# Patient Record
Sex: FEMALE
[De-identification: names, ages, dates, MRNs, and addresses within clinical notes are randomized per-mention and may not be internally consistent; named-entity substitution may affect disease eponyms.]

---

## 2018-06-17 ENCOUNTER — TRANSCRIPTION ENCOUNTER (OUTPATIENT)
Age: 23
End: 2018-06-17

## 2019-04-03 ENCOUNTER — TRANSCRIPTION ENCOUNTER (OUTPATIENT)
Age: 24
End: 2019-04-03

## 2019-04-25 ENCOUNTER — TRANSCRIPTION ENCOUNTER (OUTPATIENT)
Age: 24
End: 2019-04-25

## 2019-09-26 PROBLEM — Z00.00 ENCOUNTER FOR PREVENTIVE HEALTH EXAMINATION: Status: ACTIVE | Noted: 2019-09-26

## 2019-09-27 ENCOUNTER — APPOINTMENT (OUTPATIENT)
Dept: ORTHOPEDIC SURGERY | Facility: CLINIC | Age: 24
End: 2019-09-27
Payer: MEDICAID

## 2019-09-27 VITALS
HEIGHT: 61 IN | SYSTOLIC BLOOD PRESSURE: 100 MMHG | DIASTOLIC BLOOD PRESSURE: 65 MMHG | BODY MASS INDEX: 24.55 KG/M2 | WEIGHT: 130 LBS | HEART RATE: 78 BPM

## 2019-09-27 DIAGNOSIS — G25.89 OTHER SPECIFIED EXTRAPYRAMIDAL AND MOVEMENT DISORDERS: ICD-10-CM

## 2019-09-27 PROCEDURE — 99204 OFFICE O/P NEW MOD 45 MIN: CPT

## 2019-09-27 PROCEDURE — 73030 X-RAY EXAM OF SHOULDER: CPT | Mod: LT

## 2019-09-27 RX ORDER — MELOXICAM 7.5 MG/1
7.5 TABLET ORAL DAILY
Qty: 21 | Refills: 1 | Status: ACTIVE | COMMUNITY
Start: 2019-09-27 | End: 1900-01-01

## 2019-12-06 ENCOUNTER — APPOINTMENT (OUTPATIENT)
Dept: ORTHOPEDIC SURGERY | Facility: CLINIC | Age: 24
End: 2019-12-06

## 2021-04-21 ENCOUNTER — TRANSCRIPTION ENCOUNTER (OUTPATIENT)
Age: 26
End: 2021-04-21

## 2021-06-11 ENCOUNTER — TRANSCRIPTION ENCOUNTER (OUTPATIENT)
Age: 26
End: 2021-06-11

## 2021-06-22 ENCOUNTER — APPOINTMENT (OUTPATIENT)
Dept: GASTROENTEROLOGY | Facility: CLINIC | Age: 26
End: 2021-06-22

## 2021-06-27 ENCOUNTER — TRANSCRIPTION ENCOUNTER (OUTPATIENT)
Age: 26
End: 2021-06-27

## 2021-10-18 ENCOUNTER — APPOINTMENT (OUTPATIENT)
Dept: ORTHOPEDIC SURGERY | Facility: CLINIC | Age: 26
End: 2021-10-18
Payer: OTHER MISCELLANEOUS

## 2021-10-18 VITALS
BODY MASS INDEX: 23.41 KG/M2 | SYSTOLIC BLOOD PRESSURE: 119 MMHG | HEIGHT: 61 IN | HEART RATE: 94 BPM | DIASTOLIC BLOOD PRESSURE: 79 MMHG | WEIGHT: 124 LBS

## 2021-10-18 PROCEDURE — 99214 OFFICE O/P EST MOD 30 MIN: CPT

## 2021-10-18 PROCEDURE — 73564 X-RAY EXAM KNEE 4 OR MORE: CPT | Mod: RT

## 2021-10-18 RX ORDER — MELOXICAM 7.5 MG/1
7.5 TABLET ORAL TWICE DAILY
Qty: 42 | Refills: 0 | Status: ACTIVE | COMMUNITY
Start: 2021-10-18 | End: 1900-01-01

## 2021-10-20 NOTE — ADDENDUM
[FreeTextEntry1] : Documented by Luis Enrique Joyce acting as a scribe for Dr. Gandara on 10/18/2021. \par \par All medical record entries made by the Scribe were at my, Dr. Gandara's, direction and\par personally dictated by me on 10/18/2021. I have reviewed the chart and agree that the record\par accurately reflects my personal performance of the history, physical exam, procedure and imaging.

## 2021-10-20 NOTE — PHYSICAL EXAM
[de-identified] : Physical Exam:\par General: Well appearing, no acute distress, A&O\par Neurologic: A&Ox3, No focal deficits\par Head: NCAT without abrasions, lacerations, or ecchymosis to head, face, or scalp\par Eyes: No scleral icterus, no gross abnormalities\par Respiratory: Equal chest wall expansion bilaterally, no accessory muscle use\par Lymphatic: No lymphadenopathy palpated\par Skin: Warm and dry\par Psychiatric: Normal mood and affect\par \par Right Knee: Range of Motion in Degrees	\par 	  Claimant:	Normal:	\par Flexion Active	 135 	 135-degrees	\par Flexion Passive	 135	 135-degrees	\par Extension Active	 0-5	 0-5-degrees	\par Extension Passive	 0-5	 0-5-degrees	\par \par TTP gerids tuberculi. Moderate effusion. Patellar crepitation noted on flexion. IT band TTP. Lateral JL TTP. Tenderness over the tibial tubercle. No tenderness over the tendon at this time. No weakness to flexion/extension. Tenderness over the pes bursa. No evidence of instability in the AP plane or varus or valgus stress. Negative Lachman. Negative pivot shift. Negative anterior drawer test. positive posterior drawer test. positive Sunni. positive Apley grind. No medial joint line tenderness. No tenderness over the medial and lateral facet of the patella. patellofemoral crepitations. No lateral tilting patella. No patella apprehension. No crepitation in the medial and lateral femoral condyle. No proximal or distal swelling, edema or tenderness. No gross motor or sensory deficits. No intra-articular swelling. Extra-articular swelling over the proximal medial aspect of the tibia. 2+ DP and PT pulses. No varus or valgus malalignment. Skin is intact. No rashes, scars or lesions. \par  \par Left Knee: Range of Motion in Degrees	\par 	  Claimant:	Normal:	\par Flexion Active	 135 	 135-degrees	\par Flexion Passive	 135	 135-degrees	\par Extension Active	 0-5	 0-5-degrees	\par Extension Passive	 0-5	 0-5-degrees	\par \par No weakness to flexion/extension. No evidence of instability in the AP plane or varus or valgus stress. Negative Lachman. Negative pivot shift. Negative anterior drawer test. Negative posterior drawer test. Negative Sunni. Negative Apley grind. No medial or lateral joint line tenderness. No tenderness over the medial and lateral facet of the patella. No patellofemoral crepitations. No lateral tilting patella. No patellar apprehension. No crepitation in the medial and lateral femoral condyle. No proximal or distal swelling, edema or tenderness. No gross motor or sensory deficits. No intra-articular swelling. 2+ DP and PT pulses. No varus or valgus malalignment. Skin is intact. No rashes, scars or lesions.  [de-identified] : 4 views of R knee were performed today and available for me to review. Results were discussed with the patient. They demonstrate patella sitting centrally, Serena Liudmila 1, lateral titling of patella,  no f/x, dislocation or other deformity.\par

## 2021-10-20 NOTE — DISCUSSION/SUMMARY
[de-identified] : SAMUEL BERMAN is a 26 year female being seen for initial visit R knee pain. 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 She reports her pain began on 09/02/2021 while running at the [employer], there was no acute injury or fall. After her pain started she states she was limping. She presented to an  facility where she was d/x with a knee sprain. After this injury she took a break from training which improved her pain, but she was still experiencing pain with stairs. When she returned to training, she states her pain immediately presented again. Currently, she reports most pain with running and stairs. She endorses her knee giving out. She reports most pain over lateral knee. She endorses painful clicking. She states she is unable to run to her full potential due to pain. Patient denies numbness and tingling to the extremities. She reports taking ibuprofen with no relief. She reports no previous R knee injuries. She reports wearing an OTC knee sleeve and brace with no relief. She reports taking 600 mg ibuprofen twice per day. \par \par We had a thorough discussion regarding the nature of her pain, the pathophysiology, as well as all treatment options. Based on her normal radiographs, and clinical exam, patient is developing IT band friction syndrome along with patellar maltracking, and questionable meniscus tearing. I discussed operative and non-operative treatment modalities. Considering patient current presentation of pain, clinical exam, and radiographs, I discussed the need for MRI. Patient states [facility] already order MRI which is schedule for tomorrow. Patient has started physical therapy too at [facility], and seeing mild improvement. A prescription of Meloxicam was given to be taken as directed with food to prevent GI upset, if occurs pt to D/C and call us at that time.  Conservative measures of treatment include rest until asymptomatic, activity avoidance, NSAID's PRN, application to ice to the area 2-3x daily for 20 minutes, with gradual return to activities. Patient will follow up for MRI review upon receiving its results. All questions were answered and the patient verbalized understanding. The patient is in agreement with this treatment plan.

## 2021-10-20 NOTE — HISTORY OF PRESENT ILLNESS
[Worsening] : worsening [___ mths] : [unfilled] month(s) ago [de-identified] : SAMUEL BERMAN is a 26 year female being seen for initial visit R knee pain. 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 She reports her pain began on 09/02/2021 while running at the [employer], there was no acute injury or fall. After her pain started she states she was limping. She presented to an  facility where she was d/x with a knee sprain. After this injury she took a break from training which improved her pain, but she was still experiencing pain with stairs. When she returned to training, she states her pain immediately presented again. Currently, she reports most pain with running and stairs. She endorses her knee giving out. She reports most pain over lateral knee. She endorses painful clicking. She states she is unable to run to her full potential due to pain. Patient denies numbness and tingling to the extremities. She reports taking ibuprofen with no relief. She reports no previous R knee injuries. She reports wearing an OTC knee sleeve and brace with no relief. She reports taking 600 mg ibuprofen twice per day.

## 2021-10-21 ENCOUNTER — NON-APPOINTMENT (OUTPATIENT)
Age: 26
End: 2021-10-21

## 2021-10-22 ENCOUNTER — APPOINTMENT (OUTPATIENT)
Dept: ORTHOPEDIC SURGERY | Facility: CLINIC | Age: 26
End: 2021-10-22

## 2021-10-26 ENCOUNTER — NON-APPOINTMENT (OUTPATIENT)
Age: 26
End: 2021-10-26

## 2021-10-26 ENCOUNTER — APPOINTMENT (OUTPATIENT)
Dept: ORTHOPEDIC SURGERY | Facility: CLINIC | Age: 26
End: 2021-10-26
Payer: OTHER MISCELLANEOUS

## 2021-10-26 VITALS
BODY MASS INDEX: 23.41 KG/M2 | HEIGHT: 61 IN | HEART RATE: 67 BPM | WEIGHT: 124 LBS | DIASTOLIC BLOOD PRESSURE: 69 MMHG | SYSTOLIC BLOOD PRESSURE: 102 MMHG

## 2021-10-26 PROCEDURE — 99214 OFFICE O/P EST MOD 30 MIN: CPT | Mod: 25

## 2021-10-26 PROCEDURE — 99072 ADDL SUPL MATRL&STAF TM PHE: CPT

## 2021-10-26 PROCEDURE — 20610 DRAIN/INJ JOINT/BURSA W/O US: CPT | Mod: RT

## 2021-10-26 NOTE — PROCEDURE
[de-identified] : Injection: Right knee IT band tendon\par Indication: ITB tendinitis \par \par A discussion was had with the patient regarding this procedure and all questions were answered. All risks, benefits and alternatives were discussed. These included but were not limited to bleeding, infection, and allergic reaction. Alcohol was used to clean the skin, and betadine was used to sterilize and prep the area IT band tendon superior to gerdy's tubercle. Ethyl chloride spray was then used as a topical anesthetic. A 22-gauge needle was used to inject 3cc of 1% Xylocaine, 2cc of 0.25% Bupivacaine and 1cc of 40mg/mL Triamcinolone Acetonide into the right ITB tendon insertion at knee joint. A sterile bandage was then applied. The patient tolerated the procedure well and there were no complications \par \par \par \par lateral femoral epicondyke

## 2021-10-26 NOTE — DISCUSSION/SUMMARY
[de-identified] : SAMUEL BERMAN is a 26 year female being seen for f/u visit R knee pain. She presents today for ITB tendon cortisone injection, as  required a prior approval for this. She states her pain has been about same as before. She is massaging her IT band that provided moderate relief. \par \par We had a thorough discussion regarding the nature of her pain, the pathophysiology, as well as all treatment options. I discussed operative and non-operative treatment modalities. Pt due to her acute pain elected for a corticosteroid injection at today's visit and tolerated the procedure well. She should take it easy for the next 2-3 days while icing the joint. Conservative measures of treatment include rest until asymptomatic, activity avoidance, NSAID's PRN, application to ice to the area 2-3x daily for 20 minutes, with gradual return to activities. Patient will follow up in 6-8 wks or on prn basis for repeat clinical assessment. All questions were answered and the patient verbalized understanding. The patient is in agreement with this treatment plan.

## 2021-10-26 NOTE — PHYSICAL EXAM
[de-identified] : Physical Exam:\par General: Well appearing, no acute distress, A&O\par Neurologic: A&Ox3, No focal deficits\par Head: NCAT without abrasions, lacerations, or ecchymosis to head, face, or scalp\par Eyes: No scleral icterus, no gross abnormalities\par Respiratory: Equal chest wall expansion bilaterally, no accessory muscle use\par Lymphatic: No lymphadenopathy palpated\par Skin: Warm and dry\par Psychiatric: Normal mood and affect\par \par Right Knee: Range of Motion in Degrees	\par 	  Claimant:	Normal:	\par Flexion Active	 135 	 135-degrees	\par Flexion Passive	 135	 135-degrees	\par Extension Active	 0-5	 0-5-degrees	\par Extension Passive	 0-5	 0-5-degrees	\par \par TTP gerids tuberculi. Moderate effusion. Patellar crepitation noted on flexion. IT band TTP. Lateral JL TTP. Tenderness over the tibial tubercle. No tenderness over the tendon at this time. No weakness to flexion/extension. Tenderness over the pes bursa. No evidence of instability in the AP plane or varus or valgus stress. Negative Lachman. Negative pivot shift. Negative anterior drawer test. positive posterior drawer test. positive Sunni. positive Apley grind. No medial joint line tenderness. No tenderness over the medial and lateral facet of the patella. patellofemoral crepitations. No lateral tilting patella. No patella apprehension. No crepitation in the medial and lateral femoral condyle. No proximal or distal swelling, edema or tenderness. No gross motor or sensory deficits. No intra-articular swelling. Extra-articular swelling over the proximal medial aspect of the tibia. 2+ DP and PT pulses. No varus or valgus malalignment. Skin is intact. No rashes, scars or lesions. \par  \par Left Knee: Range of Motion in Degrees	\par 	  Claimant:	Normal:	\par Flexion Active	 135 	 135-degrees	\par Flexion Passive	 135	 135-degrees	\par Extension Active	 0-5	 0-5-degrees	\par Extension Passive	 0-5	 0-5-degrees	\par \par No weakness to flexion/extension. No evidence of instability in the AP plane or varus or valgus stress. Negative Lachman. Negative pivot shift. Negative anterior drawer test. Negative posterior drawer test. Negative Sunni. Negative Apley grind. No medial or lateral joint line tenderness. No tenderness over the medial and lateral facet of the patella. No patellofemoral crepitations. No lateral tilting patella. No patellar apprehension. No crepitation in the medial and lateral femoral condyle. No proximal or distal swelling, edema or tenderness. No gross motor or sensory deficits. No intra-articular swelling. 2+ DP and PT pulses. No varus or valgus malalignment. Skin is intact. No rashes, scars or lesions.

## 2021-10-26 NOTE — ADDENDUM
[FreeTextEntry1] : Documented by Luis Enrique Joyce acting as a scribe for Dr. Gandara on 10/26/2021. \par \par All medical record entries made by the Scribe were at my, Dr. Gandara's, direction and\par personally dictated by me on 10/26/2021. I have reviewed the chart and agree that the record\par accurately reflects my personal performance of the history, physical exam, procedure and imaging.

## 2021-10-26 NOTE — HISTORY OF PRESENT ILLNESS
[Worsening] : worsening [___ mths] : [unfilled] month(s) ago [de-identified] : SAMUEL BERMAN is a 26 year female being seen for f/u visit R knee pain. She presents today for ITB tendon cortisone injection, as  required a prior approval for this. She states her pain has been about same as before. She is massaging her IT band that provided moderate relief.

## 2021-10-27 ENCOUNTER — FORM ENCOUNTER (OUTPATIENT)
Age: 26
End: 2021-10-27

## 2021-11-08 ENCOUNTER — APPOINTMENT (OUTPATIENT)
Dept: ORTHOPEDIC SURGERY | Facility: CLINIC | Age: 26
End: 2021-11-08
Payer: OTHER MISCELLANEOUS

## 2021-11-08 VITALS
HEIGHT: 61 IN | SYSTOLIC BLOOD PRESSURE: 115 MMHG | BODY MASS INDEX: 23.41 KG/M2 | DIASTOLIC BLOOD PRESSURE: 73 MMHG | HEART RATE: 75 BPM | WEIGHT: 124 LBS

## 2021-11-08 DIAGNOSIS — M75.22 BICIPITAL TENDINITIS, LEFT SHOULDER: ICD-10-CM

## 2021-11-08 PROCEDURE — 99072 ADDL SUPL MATRL&STAF TM PHE: CPT

## 2021-11-08 PROCEDURE — 99214 OFFICE O/P EST MOD 30 MIN: CPT

## 2021-11-08 NOTE — ADDENDUM
[FreeTextEntry1] : Documented by Luis Enrique Joyce acting as a scribe for Dr. Gandara on 11/08/2021. \par \par All medical record entries made by the Scribe were at my, Dr. Gandara's, direction and\par personally dictated by me on 11/08/2021. I have reviewed the chart and agree that the record\par accurately reflects my personal performance of the history, physical exam, procedure and imaging.

## 2021-11-08 NOTE — PHYSICAL EXAM
[de-identified] : Physical Exam:\par General: Well appearing, no acute distress, A&O\par Neurologic: A&Ox3, No focal deficits\par Head: NCAT without abrasions, lacerations, or ecchymosis to head, face, or scalp\par Eyes: No scleral icterus, no gross abnormalities\par Respiratory: Equal chest wall expansion bilaterally, no accessory muscle use\par Lymphatic: No lymphadenopathy palpated\par Skin: Warm and dry\par Psychiatric: Normal mood and affect\par \par Right Knee: Range of Motion in Degrees	\par 	  Claimant:	Normal:	\par Flexion Active	 135 	 135-degrees	\par Flexion Passive	 135	 135-degrees	\par Extension Active	 0-5	 0-5-degrees	\par Extension Passive	 0-5	 0-5-degrees	\par \par TTP gerids tuberculi. Moderate effusion. Patellar crepitation noted on flexion. IT band TTP. Lateral JL TTP. Tenderness over the tibial tubercle. No tenderness over the tendon at this time. No weakness to flexion/extension. Tenderness over the pes bursa. No evidence of instability in the AP plane or varus or valgus stress. Negative Lachman. Negative pivot shift. Negative anterior drawer test. positive posterior drawer test. positive Sunni. positive Apley grind. No medial joint line tenderness. No tenderness over the medial and lateral facet of the patella. patellofemoral crepitations. No lateral tilting patella. No patella apprehension. No crepitation in the medial and lateral femoral condyle. No proximal or distal swelling, edema or tenderness. No gross motor or sensory deficits. No intra-articular swelling. Extra-articular swelling over the proximal medial aspect of the tibia. 2+ DP and PT pulses. No varus or valgus malalignment. Skin is intact. No rashes, scars or lesions. \par  \par Left Knee: Range of Motion in Degrees	\par 	  Claimant:	Normal:	\par Flexion Active	 135 	 135-degrees	\par Flexion Passive	 135	 135-degrees	\par Extension Active	 0-5	 0-5-degrees	\par Extension Passive	 0-5	 0-5-degrees	\par \par No weakness to flexion/extension. No evidence of instability in the AP plane or varus or valgus stress. Negative Lachman. Negative pivot shift. Negative anterior drawer test. Negative posterior drawer test. Negative Sunni. Negative Apley grind. No medial or lateral joint line tenderness. No tenderness over the medial and lateral facet of the patella. No patellofemoral crepitations. No lateral tilting patella. No patellar apprehension. No crepitation in the medial and lateral femoral condyle. No proximal or distal swelling, edema or tenderness. No gross motor or sensory deficits. No intra-articular swelling. 2+ DP and PT pulses. No varus or valgus malalignment. Skin is intact. No rashes, scars or lesions.

## 2021-11-08 NOTE — DISCUSSION/SUMMARY
[de-identified] : SAMUEL BERMAN is a 26 year female being seen for f/u visit R knee pain. She had cortisone injection at last visit that provided her relief. She reports noticing stiffness in IT band, along with pain in lateral knee joint. She is performing physical therapy and noticing improvement. 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 \par \par We had a thorough discussion regarding the nature of her pain, the pathophysiology, as well as all treatment options. She has significant tenderness over IT band, and gerdis tubercle. I discussed as of now continuing physical therapy, but with another location as they haven't been massaging. A new PT prescription was provided today. Conservative measures of treatment include rest until asymptomatic, activity avoidance, NSAID's PRN, application to ice to the area 2-3x daily for 20 minutes, with gradual return to activities. Patient will follow up in 2-3 wks for repeat clinical assessment. All questions were answered and the patient verbalized understanding. The patient is in agreement with this treatment plan.

## 2021-11-08 NOTE — HISTORY OF PRESENT ILLNESS
[Worsening] : worsening [___ mths] : [unfilled] month(s) ago [de-identified] : SAMUEL BERMAN is a 26 year female being seen for f/u visit R knee pain. She had cortisone injection at last visit that provided her relief. She reports noticing stiffness in IT band, along with pain in lateral knee joint. She is performing physical therapy and noticing improvement. 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

## 2021-11-15 ENCOUNTER — RX RENEWAL (OUTPATIENT)
Age: 26
End: 2021-11-15

## 2021-11-15 RX ORDER — MELOXICAM 7.5 MG/1
7.5 TABLET ORAL
Qty: 21 | Refills: 0 | Status: ACTIVE | COMMUNITY
Start: 2021-10-19 | End: 1900-01-01

## 2021-11-15 RX ORDER — MELOXICAM 7.5 MG/1
7.5 TABLET ORAL
Qty: 42 | Refills: 0 | Status: ACTIVE | COMMUNITY
Start: 2021-11-15 | End: 1900-01-01

## 2021-11-29 ENCOUNTER — APPOINTMENT (OUTPATIENT)
Dept: ORTHOPEDIC SURGERY | Facility: CLINIC | Age: 26
End: 2021-11-29
Payer: OTHER MISCELLANEOUS

## 2021-11-29 VITALS
WEIGHT: 124 LBS | BODY MASS INDEX: 23.41 KG/M2 | HEART RATE: 81 BPM | SYSTOLIC BLOOD PRESSURE: 117 MMHG | DIASTOLIC BLOOD PRESSURE: 78 MMHG | HEIGHT: 61 IN

## 2021-11-29 PROCEDURE — 99072 ADDL SUPL MATRL&STAF TM PHE: CPT

## 2021-11-29 PROCEDURE — 99214 OFFICE O/P EST MOD 30 MIN: CPT

## 2021-11-29 NOTE — HISTORY OF PRESENT ILLNESS
[___ mths] : [unfilled] month(s) ago [Improving] : improving [de-identified] : SAMUEL BERMAN is a 26 year female being seen for f/u visit R knee pain. She had cortisone injection on 10/26/2021 that provided her relief. Currently,  she reports some improvement with physical therapy, but not complete relief. She reports [facility] therapy did loosen her It band. \par

## 2021-11-29 NOTE — PHYSICAL EXAM
[de-identified] : Physical Exam:\par General: Well appearing, no acute distress, A&O\par Neurologic: A&Ox3, No focal deficits\par Head: NCAT without abrasions, lacerations, or ecchymosis to head, face, or scalp\par Eyes: No scleral icterus, no gross abnormalities\par Respiratory: Equal chest wall expansion bilaterally, no accessory muscle use\par Lymphatic: No lymphadenopathy palpated\par Skin: Warm and dry\par Psychiatric: Normal mood and affect\par \par Right Knee: Range of Motion in Degrees	\par 	  Claimant:	Normal:	\par Flexion Active	 135 	 135-degrees	\par Flexion Passive	 135	 135-degrees	\par Extension Active	 0-5	 0-5-degrees	\par Extension Passive	 0-5	 0-5-degrees	\par \par No effusion. Patellar crepitation noted on flexion. IT band TTP but significantly less than previous exam. Lateral JL TTP.  No tenderness over the tendon at this time. No weakness to flexion/extension. Tenderness over the pes bursa. No evidence of instability in the AP plane or varus or valgus stress. Negative Lachman. Negative pivot shift. Negative anterior drawer test. positive posterior drawer test. positive Sunni. positive Apley grind. No medial joint line tenderness. No tenderness over the medial and lateral facet of the patella. patellofemoral crepitations. No lateral tilting patella. No patella apprehension. No crepitation in the medial and lateral femoral condyle. No proximal or distal swelling, edema or tenderness. No gross motor or sensory deficits. No intra-articular swelling. Extra-articular swelling over the proximal medial aspect of the tibia. 2+ DP and PT pulses. No varus or valgus malalignment. Skin is intact. No rashes, scars or lesions. \par  \par Left Knee: Range of Motion in Degrees	\par 	  Claimant:	Normal:	\par Flexion Active	 135 	 135-degrees	\par Flexion Passive	 135	 135-degrees	\par Extension Active	 0-5	 0-5-degrees	\par Extension Passive	 0-5	 0-5-degrees	\par \par TTP over quadriceps origin at hip. No weakness to flexion/extension. No evidence of instability in the AP plane or varus or valgus stress. Negative Lachman. Negative pivot shift. Negative anterior drawer test. Negative posterior drawer test. Negative Sunni. Negative Apley grind. No medial or lateral joint line tenderness. No tenderness over the medial and lateral facet of the patella. No patellofemoral crepitations. No lateral tilting patella. No patellar apprehension. No crepitation in the medial and lateral femoral condyle. No proximal or distal swelling, edema or tenderness. No gross motor or sensory deficits. No intra-articular swelling. 2+ DP and PT pulses. No varus or valgus malalignment. Skin is intact. No rashes, scars or lesions.

## 2021-11-29 NOTE — ADDENDUM
[FreeTextEntry1] : Documented by Luis Enrique Joyce acting as a scribe for Dr. Gandara on 11/29/2021. \par \par All medical record entries made by the Scribe were at my, Dr. Gandara's, direction and\par personally dictated by me on 11/29/2021. I have reviewed the chart and agree that the record\par accurately reflects my personal performance of the history, physical exam, procedure and imaging.

## 2021-11-29 NOTE — DISCUSSION/SUMMARY
[de-identified] : SAMUEL BERMAN is a 26 year female being seen for f/u visit R knee pain. She had cortisone injection on 10/26/2021 that provided her relief. Currently,  she reports some improvement with physical therapy, but not complete relief. She reports [facility] therapy did loosen her It band. \par \par We had a thorough discussion regarding the nature of her pain, the pathophysiology, as well as all treatment options. Given her exam today, she will continue physical therapy, as there is mild IT band tenderness. She may start running also, patient want to run outdoor on track, which she may do as tolerated. She also few doses of mobic left, which she may continue to take. Conservative measures of treatment include rest until asymptomatic, activity avoidance, NSAID's PRN, application to ice to the area 2-3x daily for 20 minutes, with gradual return to activities. Patient will follow up in 2-3 wks for repeat clinical assessment. All questions were answered and the patient verbalized understanding. The patient is in agreement with this treatment plan.

## 2021-12-20 ENCOUNTER — APPOINTMENT (OUTPATIENT)
Dept: ORTHOPEDIC SURGERY | Facility: CLINIC | Age: 26
End: 2021-12-20
Payer: OTHER MISCELLANEOUS

## 2021-12-20 PROCEDURE — 99214 OFFICE O/P EST MOD 30 MIN: CPT

## 2021-12-20 PROCEDURE — 99072 ADDL SUPL MATRL&STAF TM PHE: CPT

## 2021-12-20 RX ORDER — MELOXICAM 7.5 MG/1
7.5 TABLET ORAL DAILY
Qty: 30 | Refills: 2 | Status: COMPLETED | COMMUNITY
Start: 2021-12-20 | End: 2022-03-20

## 2021-12-20 RX ORDER — DICLOFENAC SODIUM 20 MG/G
2 SOLUTION TOPICAL
Qty: 112 | Refills: 0 | Status: DISCONTINUED | COMMUNITY
Start: 2021-10-10

## 2021-12-20 RX ORDER — PIMECROLIMUS 10 MG/G
1 CREAM TOPICAL
Qty: 30 | Refills: 0 | Status: DISCONTINUED | COMMUNITY
Start: 2021-06-27

## 2021-12-20 RX ORDER — IBUPROFEN AND FAMOTIDINE 26.6; 8 MG/1; MG/1
800-26.6 TABLET, COATED ORAL
Qty: 90 | Refills: 0 | Status: DISCONTINUED | COMMUNITY
Start: 2021-10-10

## 2021-12-20 NOTE — HISTORY OF PRESENT ILLNESS
[Improving] : improving [___ mths] : [unfilled] month(s) ago [de-identified] : SAMUEL BERMAN is a 26 year female being seen for f/u visit R knee pain. She had cortisone injection on 10/26/2021 that provided her until recently. She reports 2 weeks ago, her pain exacerbated.  She is performing physical therapy at this time, and reports improvement in relief, especially her IT band is loosening. She denies numbness or tingling down to extremity. She denies any hip pain at this time.

## 2021-12-20 NOTE — DISCUSSION/SUMMARY
[de-identified] : SAMUEL BERMAN is a 26 year female being seen for f/u visit R knee pain. She had cortisone injection on 10/26/2021 that provided her until recently. She reports 2 weeks ago, her pain exacerbated.  She is performing physical therapy at this time, and reports improvement in relief, especially her IT band is loosening. She denies numbness or tingling down to extremity. She denies any hip pain at this time. \par \par We had a thorough discussion regarding the nature of her pain, the pathophysiology, as well as all treatment options. Based on her clinical exam, and symptoms, there is definitely progress both structurally and physically. There is mild improvement in pain following mobic, cortisone, and physical therapy, she has been noticing muscle activation, and functionally her symptoms are improving, especially IT band, but patient has not achieved 100% resolution of symptoms. A prescription of Meloxicam was given to be taken as directed with food to prevent GI upset, if occurs pt to D/C and call us at that time. I advised her to obtain OTC orthotics, and wear it as tolerated. Patient will follow up in 3 wks for repeat clinical assessment. All questions were answered and the patient verbalized understanding. The patient is in agreement with this treatment plan.

## 2021-12-20 NOTE — PHYSICAL EXAM
[de-identified] : Physical Exam:\par General: Well appearing, no acute distress, A&O\par Neurologic: A&Ox3, No focal deficits\par Head: NCAT without abrasions, lacerations, or ecchymosis to head, face, or scalp\par Eyes: No scleral icterus, no gross abnormalities\par Respiratory: Equal chest wall expansion bilaterally, no accessory muscle use\par Lymphatic: No lymphadenopathy palpated\par Skin: Warm and dry\par Psychiatric: Normal mood and affect\par \par Right Knee: Range of Motion in Degrees	\par 	  Claimant:	Normal:	\par Flexion Active	 135 	 135-degrees	\par Flexion Passive	 135	 135-degrees	\par Extension Active	 0-5	 0-5-degrees	\par Extension Passive	 0-5	 0-5-degrees	\par \par Mild ecchymosis noted. No effusion. Patellar crepitation noted on flexion. IT band TTP but significantly less than previous exam. Lateral JL TTP.  No tenderness over the tendon at this time. No weakness to flexion/extension. Tenderness over the pes bursa. No evidence of instability in the AP plane or varus or valgus stress. Negative Lachman. Negative pivot shift. Negative anterior drawer test. positive posterior drawer test. positive Sunni. positive Apley grind. No medial joint line tenderness. No tenderness over the medial and lateral facet of the patella. patellofemoral crepitations. No lateral tilting patella. No patella apprehension. No crepitation in the medial and lateral femoral condyle. No proximal or distal swelling, edema or tenderness. No gross motor or sensory deficits. No intra-articular swelling. Extra-articular swelling over the proximal medial aspect of the tibia. 2+ DP and PT pulses. No varus or valgus malalignment. Skin is intact. No rashes, scars or lesions. \par  \par Left Knee: Range of Motion in Degrees	\par 	  Claimant:	Normal:	\par Flexion Active	 135 	 135-degrees	\par Flexion Passive	 135	 135-degrees	\par Extension Active	 0-5	 0-5-degrees	\par Extension Passive	 0-5	 0-5-degrees	\par \par TTP over quadriceps origin at hip. No weakness to flexion/extension. No evidence of instability in the AP plane or varus or valgus stress. Negative Lachman. Negative pivot shift. Negative anterior drawer test. Negative posterior drawer test. Negative Sunni. Negative Apley grind. No medial or lateral joint line tenderness. No tenderness over the medial and lateral facet of the patella. No patellofemoral crepitations. No lateral tilting patella. No patellar apprehension. No crepitation in the medial and lateral femoral condyle. No proximal or distal swelling, edema or tenderness. No gross motor or sensory deficits. No intra-articular swelling. 2+ DP and PT pulses. No varus or valgus malalignment. Skin is intact. No rashes, scars or lesions. \par \par Right hip Exam\par \par ·	Skin: Clean/dry and intact\par ·	Inspection: No obvious deformity, no swelling.\par ·	Pulses: 2+ DP/PT pulses\par ·	ROM: 130 flexion without pain. Internal rotation to 15. External rotation to 45.\par ·	Painful ROM: None\par ·	Tenderness: No tenderness over greater trochanter/glut medius insertion. No groin pain. No ttp over the ASIS/Illiac crest.\par ·	Strength: 5/5 ADD/ABD/Q/H/TA/GS/EHL\par ·	Neuro: Sensation in tact to light touch throughout, DTR normal\par ·	Additional tests: Negative impingement test

## 2021-12-20 NOTE — ADDENDUM
[FreeTextEntry1] : Documented by Luis Enrique Joyce acting as a scribe for Dr. Gandara on 12/20/2021. \par \par All medical record entries made by the Scribe were at my, Dr. Gandara's, direction and\par personally dictated by me on 12/20/2021. I have reviewed the chart and agree that the record\par accurately reflects my personal performance of the history, physical exam, procedure and imaging.

## 2022-01-13 ENCOUNTER — APPOINTMENT (OUTPATIENT)
Dept: ORTHOPEDIC SURGERY | Facility: CLINIC | Age: 27
End: 2022-01-13
Payer: OTHER MISCELLANEOUS

## 2022-01-13 PROCEDURE — 20610 DRAIN/INJ JOINT/BURSA W/O US: CPT | Mod: RT

## 2022-01-13 PROCEDURE — 99072 ADDL SUPL MATRL&STAF TM PHE: CPT

## 2022-01-13 PROCEDURE — 99214 OFFICE O/P EST MOD 30 MIN: CPT | Mod: 25

## 2022-01-14 NOTE — PROCEDURE
[de-identified] : Injection: Right knee IT band tendon\par Indication: ITB tendinitis \par \par A discussion was had with the patient regarding this procedure and all questions were answered. All risks, benefits and alternatives were discussed. These included but were not limited to bleeding, infection, and allergic reaction. Alcohol was used to clean the skin, and betadine was used to sterilize and prep the area IT band tendon superior to gerdy's tubercle. Ethyl chloride spray was then used as a topical anesthetic. A 22-gauge needle was used to inject 3cc of 1% Xylocaine, 2cc of 0.25% Bupivacaine and 1cc of 40mg/mL Triamcinolone Acetonide into the right ITB tendon insertion at knee joint. A sterile bandage was then applied. The patient tolerated the procedure well and there were no complications \par

## 2022-01-14 NOTE — PHYSICAL EXAM
[de-identified] : Physical Exam:\par General: Well appearing, no acute distress, A&O\par Neurologic: A&Ox3, No focal deficits\par Head: NCAT without abrasions, lacerations, or ecchymosis to head, face, or scalp\par Eyes: No scleral icterus, no gross abnormalities\par Respiratory: Equal chest wall expansion bilaterally, no accessory muscle use\par Lymphatic: No lymphadenopathy palpated\par Skin: Warm and dry\par Psychiatric: Normal mood and affect\par \par Right Knee: Range of Motion in Degrees	\par 	  Claimant:	Normal:	\par Flexion Active	 135 	 135-degrees	\par Flexion Passive	 135	 135-degrees	\par Extension Active	 0-5	 0-5-degrees	\par Extension Passive	 0-5	 0-5-degrees	\par \par Mild ecchymosis noted. No effusion. Patellar crepitation noted on flexion. IT band TTP but significantly less than previous exam. Lateral JL TTP.  No tenderness over the tendon at this time. No weakness to flexion/extension. Tenderness over the pes bursa. No evidence of instability in the AP plane or varus or valgus stress. Negative Lachman. Negative pivot shift. Negative anterior drawer test. positive posterior drawer test. positive Sunni. positive Apley grind. No medial joint line tenderness. No tenderness over the medial and lateral facet of the patella. patellofemoral crepitations. No lateral tilting patella. No patella apprehension. No crepitation in the medial and lateral femoral condyle. No proximal or distal swelling, edema or tenderness. No gross motor or sensory deficits. No intra-articular swelling. Extra-articular swelling over the proximal medial aspect of the tibia. 2+ DP and PT pulses. No varus or valgus malalignment. Skin is intact. No rashes, scars or lesions. \par  \par Left Knee: Range of Motion in Degrees	\par 	  Claimant:	Normal:	\par Flexion Active	 135 	 135-degrees	\par Flexion Passive	 135	 135-degrees	\par Extension Active	 0-5	 0-5-degrees	\par Extension Passive	 0-5	 0-5-degrees	\par \par TTP over quadriceps origin at hip. No weakness to flexion/extension. No evidence of instability in the AP plane or varus or valgus stress. Negative Lachman. Negative pivot shift. Negative anterior drawer test. Negative posterior drawer test. Negative Sunni. Negative Apley grind. No medial or lateral joint line tenderness. No tenderness over the medial and lateral facet of the patella. No patellofemoral crepitations. No lateral tilting patella. No patellar apprehension. No crepitation in the medial and lateral femoral condyle. No proximal or distal swelling, edema or tenderness. No gross motor or sensory deficits. No intra-articular swelling. 2+ DP and PT pulses. No varus or valgus malalignment. Skin is intact. No rashes, scars or lesions. \par \par Right hip Exam\par \par ·	Skin: Clean/dry and intact\par ·	Inspection: No obvious deformity, no swelling.\par ·	Pulses: 2+ DP/PT pulses\par ·	ROM: 130 flexion without pain. Internal rotation to 15. External rotation to 45.\par ·	Painful ROM: None\par ·	Tenderness: No tenderness over greater trochanter/glut medius insertion. No groin pain. No ttp over the ASIS/Illiac crest.\par ·	Strength: 5/5 ADD/ABD/Q/H/TA/GS/EHL\par ·	Neuro: Sensation in tact to light touch throughout, DTR normal\par ·	Additional tests: Negative impingement test

## 2022-01-14 NOTE — ADDENDUM
[FreeTextEntry1] : Documented by Luis Enrique Joyce acting as a scribe for Dr. Gandara on 01/13/2022. \par \par All medical record entries made by the Scribe were at my, Dr. Gandara's, direction and\par personally dictated by me on 01/13/2022. I have reviewed the chart and agree that the record\par accurately reflects my personal performance of the history, physical exam, procedure and imaging.

## 2022-01-14 NOTE — HISTORY OF PRESENT ILLNESS
[Stable] : stable [___ mths] : [unfilled] month(s) ago [4] : a current pain level of 4/10 [5] : an average pain level of 5/10 [de-identified] : SAMUEL BERMAN is a 26 year female being seen for f/u visit R knee pain. She had cortisone injection on 10/26/2021 that provided her until recently. She reports 3-4 weeks ago, her pain exacerbated.  She is performing physical therapy at this time, and reports improvement in relief. She denies numbness or tingling down to extremity.

## 2022-01-14 NOTE — DISCUSSION/SUMMARY
[de-identified] : SAMUEL BERMAN is a 26 year female being seen for f/u visit R knee pain. She had cortisone injection on 10/26/2021 that provided her until recently. She reports 3-4 weeks ago, her pain exacerbated.  She is performing physical therapy at this time, and reports improvement in relief. She denies numbness or tingling down to extremity. \par \par We had a thorough discussion regarding the nature of her pain, the pathophysiology, as well as all treatment options. I discussed operative and non-operative treatment modalities. Pt due to her acute pain elected for a corticosteroid injection at today's visit and tolerated the procedure well. She should take it easy for the next 2-3 days while icing the joint. She will continue taking remaining dosages of mobic. Patient will follow up on prn basis for repeat clinical assessment. All questions were answered and the patient verbalized understanding. The patient is in agreement with this treatment plan.

## 2022-01-20 ENCOUNTER — FORM ENCOUNTER (OUTPATIENT)
Age: 27
End: 2022-01-20

## 2022-01-23 ENCOUNTER — FORM ENCOUNTER (OUTPATIENT)
Age: 27
End: 2022-01-23

## 2022-02-10 ENCOUNTER — APPOINTMENT (OUTPATIENT)
Dept: ORTHOPEDIC SURGERY | Facility: CLINIC | Age: 27
End: 2022-02-10
Payer: OTHER MISCELLANEOUS

## 2022-02-10 VITALS
WEIGHT: 124 LBS | HEART RATE: 92 BPM | DIASTOLIC BLOOD PRESSURE: 73 MMHG | BODY MASS INDEX: 23.41 KG/M2 | HEIGHT: 61 IN | SYSTOLIC BLOOD PRESSURE: 110 MMHG

## 2022-02-10 PROCEDURE — 99072 ADDL SUPL MATRL&STAF TM PHE: CPT

## 2022-02-10 PROCEDURE — 99214 OFFICE O/P EST MOD 30 MIN: CPT

## 2022-02-10 RX ORDER — DICLOFENAC SODIUM 75 MG/1
75 TABLET, DELAYED RELEASE ORAL
Qty: 60 | Refills: 0 | Status: ACTIVE | COMMUNITY
Start: 2022-02-10 | End: 1900-01-01

## 2022-02-23 NOTE — REASON FOR VISIT
[Follow-Up Visit] : a follow-up visit for [Workers' Comp: Date of Injury: _______] : This visit is related to worker's compensation. Date of Injury: [unfilled] [FreeTextEntry2] : R knee pain.

## 2022-02-23 NOTE — ADDENDUM
[FreeTextEntry1] : Documented by Luis Enrique Joyce acting as a scribe for Dr. Gandara on 02/10/2022. \par \par All medical record entries made by the Scribe were at my, Dr. Gandara's, direction and\par personally dictated by me on 02/10/2022. I have reviewed the chart and agree that the record\par accurately reflects my personal performance of the history, physical exam, procedure and imaging. \par \par Cortisone injection IT band (10/26 and 1/13)\par Mobic, PT, orthotics. \par MRI (R-10/19/21)

## 2022-02-23 NOTE — PHYSICAL EXAM
[de-identified] : Physical Exam:\par General: Well appearing, no acute distress, A&O\par Neurologic: A&Ox3, No focal deficits\par Head: NCAT without abrasions, lacerations, or ecchymosis to head, face, or scalp\par Eyes: No scleral icterus, no gross abnormalities\par Respiratory: Equal chest wall expansion bilaterally, no accessory muscle use\par Lymphatic: No lymphadenopathy palpated\par Skin: Warm and dry\par Psychiatric: Normal mood and affect\par \par Right Knee: Range of Motion in Degrees	\par 	  Claimant:	Normal:	\par Flexion Active	 135 	 135-degrees	\par Flexion Passive	 135	 135-degrees	\par Extension Active	 0-5	 0-5-degrees	\par Extension Passive	 0-5	 0-5-degrees	\par \par Mild ecchymosis noted. No effusion. Patellar crepitation noted on flexion. IT band TTP but significantly less than previous exam. Lateral JL TTP.  No tenderness over the tendon at this time. No weakness to flexion/extension. Tenderness over the pes bursa. No evidence of instability in the AP plane or varus or valgus stress. Negative Lachman. Negative pivot shift. Negative anterior drawer test. positive posterior drawer test. positive Sunni. positive Apley grind. No medial joint line tenderness. No tenderness over the medial and lateral facet of the patella. patellofemoral crepitations. No lateral tilting patella. No patella apprehension. No crepitation in the medial and lateral femoral condyle. No proximal or distal swelling, edema or tenderness. No gross motor or sensory deficits. No intra-articular swelling. Extra-articular swelling over the proximal medial aspect of the tibia. 2+ DP and PT pulses. No varus or valgus malalignment. Skin is intact. No rashes, scars or lesions. \par  \par Left Knee: Range of Motion in Degrees	\par 	  Claimant:	Normal:	\par Flexion Active	 135 	 135-degrees	\par Flexion Passive	 135	 135-degrees	\par Extension Active	 0-5	 0-5-degrees	\par Extension Passive	 0-5	 0-5-degrees	\par \par TTP over quadriceps origin at hip. No weakness to flexion/extension. No evidence of instability in the AP plane or varus or valgus stress. Negative Lachman. Negative pivot shift. Negative anterior drawer test. Negative posterior drawer test. Negative Sunni. Negative Apley grind. No medial or lateral joint line tenderness. No tenderness over the medial and lateral facet of the patella. No patellofemoral crepitations. No lateral tilting patella. No patellar apprehension. No crepitation in the medial and lateral femoral condyle. No proximal or distal swelling, edema or tenderness. No gross motor or sensory deficits. No intra-articular swelling. 2+ DP and PT pulses. No varus or valgus malalignment. Skin is intact. No rashes, scars or lesions. \par \par Right hip Exam\par \par ·	Skin: Clean/dry and intact\par ·	Inspection: No obvious deformity, no swelling.\par ·	Pulses: 2+ DP/PT pulses\par ·	ROM: 130 flexion without pain. Internal rotation to 15. External rotation to 45.\par ·	Painful ROM: None\par ·	Tenderness: No tenderness over greater trochanter/glut medius insertion. No groin pain. No ttp over the ASIS/Illiac crest.\par ·	Strength: 5/5 ADD/ABD/Q/H/TA/GS/EHL\par ·	Neuro: Sensation in tact to light touch throughout, DTR normal\par ·	Additional tests: Negative impingement test

## 2022-02-23 NOTE — DISCUSSION/SUMMARY
[de-identified] : SAMUEL BERMAN is a 26 year female being seen for f/u visit R knee pain. She received a steroid injection to the ITB last visit, and reports only mild short term benefit. She reports continued pain when running/walking, along with bending/squatting. She continues to localize the pain laterally. She reports while taking meloxicam she had some mild benefit, but no overall lasting improvement of symptoms. She continues to work light duty at this time. \par \par We had a thorough discussion regarding the nature of her pain, the pathophysiology, as well as all treatment options. On clinical exam, her IT is significantly looser than at previous visit. Patient tried running a mile a week ago, and was unable to complete 1 mile. Patient was given prescription of formal physical therapy that she will perform 2x/wk for 6-8 wks. A prescription of Diclofenac was given and patient was informed about its risks and benefits. In future we might consider a repeat MRI to understands progress of underlying pathology. Patient will follow up in 4-6 wks or on prn basis for repeat clinical assessment. She agrees with the above plan and all questions were answered.

## 2022-02-23 NOTE — HISTORY OF PRESENT ILLNESS
[Stable] : stable [___ mths] : [unfilled] month(s) ago [4] : a current pain level of 4/10 [5] : an average pain level of 5/10 [Bending] : worsened by bending [Running] : worsened by running [Knee Flexion] : worsened with knee flexion [NSAIDs] : relieved by nonsteroidal anti-inflammatory drugs [Physical Therapy] : relieved by physical therapy [Rest] : relieved by rest [de-identified] : SAMUEL BERMAN is a 26 year female being seen for f/u visit R knee pain. She received a steroid injection to the ITB last visit, and reports only mild short term benefit. She reports continued pain when running/walking, along with bending/squatting. She continues to localize the pain laterally. She reports while taking meloxicam she had some mild benefit, but no overall lasting improvement of symptoms. She continues to work light duty at this time. \par  [FreeTextEntry3] : Running, climbing stairs, pivoting, squatting, jumping.  [FreeTextEntry4] : Cortisone inj (01/13/22) into IT band, mobic, physical therapy, cortisone inj (10/26/21) into IT band. Both cortisone injection provided her about a month of relief, although second one was less effective. With PT, she did have some relief with graston technique; however, relief was temporary. Mobic along with OTC acetaminophen provide short term symptomatic and pain relief, respectively.   [FreeTextEntry5] : N/A

## 2022-04-18 ENCOUNTER — APPOINTMENT (OUTPATIENT)
Dept: ORTHOPEDIC SURGERY | Facility: CLINIC | Age: 27
End: 2022-04-18
Payer: OTHER MISCELLANEOUS

## 2022-04-18 PROCEDURE — 99214 OFFICE O/P EST MOD 30 MIN: CPT

## 2022-04-18 PROCEDURE — 99072 ADDL SUPL MATRL&STAF TM PHE: CPT

## 2022-04-18 NOTE — HISTORY OF PRESENT ILLNESS
[Stable] : stable [___ mths] : [unfilled] month(s) ago [4] : a current pain level of 4/10 [5] : an average pain level of 5/10 [Bending] : worsened by bending [Running] : worsened by running [Knee Flexion] : worsened with knee flexion [NSAIDs] : relieved by nonsteroidal anti-inflammatory drugs [Physical Therapy] : relieved by physical therapy [Rest] : relieved by rest [de-identified] : SAMUEL BERMAN is a 26 year female being seen for f/u visit R knee pain. At last visit, patient was seeing progress in her ROM and strength, and allowed to return to work, training. However, soon after running, she felt buckling, and instability in knee. Thereafter, she was placed back in light duty. She has been doing PT, but denies graston at this time. She does complain of Right hip pain especially over lateral aspect of the hip. She denies numbness or tingling down to extremity. She has done PT for her hip without relief. Patient was given diclofenac at last visit given her hip and knee pain, but she reports moderate relief, and that’s only for short period of time. \par  [FreeTextEntry3] : Running, climbing stairs, pivoting, squatting, jumping.  [FreeTextEntry4] : Cortisone inj (01/13/22) into IT band, mobic, physical therapy, cortisone inj (10/26/21) into IT band. Both cortisone injection provided her about a month of relief, although second one was less effective. With PT, she did have some relief with graston technique; however, relief was temporary. Mobic along with OTC acetaminophen provide short term symptomatic and pain relief, respectively.   [FreeTextEntry5] : N/A

## 2022-04-18 NOTE — PHYSICAL EXAM
[de-identified] : Physical Exam:\par General: Well appearing, no acute distress, A&O\par Neurologic: A&Ox3, No focal deficits\par Head: NCAT without abrasions, lacerations, or ecchymosis to head, face, or scalp\par Eyes: No scleral icterus, no gross abnormalities\par Respiratory: Equal chest wall expansion bilaterally, no accessory muscle use\par Lymphatic: No lymphadenopathy palpated\par Skin: Warm and dry\par Psychiatric: Normal mood and affect\par \par Right Hip Exam\par \par Skin: Clean/dry and intact\par Inspection: No obvious deformity, no swelling.\par Pulses: 2+ DP/PT pulses\par ROM: 110 flexion without pain. Internal rotation to 15. External rotation to 35 with pain over greater troc.\par Painful ROM: None\par Tenderness: Positive tenderness over lateral femoral epicondyle. Otherwise no greater trochanter/glut medius insertion. No groin pain. No ttp over the ASIS/Illiac crest.\par Strength: 5/5 ADD/ABD/Q/H/TA/GS/EHL\par Neuro: Sensation in tact to light touch throughout, DTR normal\par Additional tests: Negative impingement test\par Pain with resisted hip abduction\par NAHED Test (Vinayak's Test): Negative\par FADIR Test (Labral Tear/HAYDE): Negative\par  \par Right Knee: Range of Motion in Degrees	\par 	  Claimant:	Normal:	\par Flexion Active	 135 	 135-degrees	\par Flexion Passive	 135	 135-degrees	\par Extension Active	 0-5	 0-5-degrees	\par Extension Passive	 0-5	 0-5-degrees	\par \par Mild ecchymosis noted. No effusion. Patellar crepitation noted on flexion. IT band TTP but significantly less than previous exam. Lateral JL TTP.  No tenderness over the tendon at this time. No weakness to flexion/extension. Tenderness over the pes bursa. No evidence of instability in the AP plane or varus or valgus stress. Negative Lachman. Negative pivot shift. Negative anterior drawer test. positive posterior drawer test. positive Sunni. positive Apley grind. No medial joint line tenderness. No tenderness over the medial and lateral facet of the patella. patellofemoral crepitations. No lateral tilting patella. No patella apprehension. No crepitation in the medial and lateral femoral condyle. No proximal or distal swelling, edema or tenderness. No gross motor or sensory deficits. No intra-articular swelling. Extra-articular swelling over the proximal medial aspect of the tibia. 2+ DP and PT pulses. No varus or valgus malalignment. Skin is intact. No rashes, scars or lesions. \par  \par Left Knee: Range of Motion in Degrees	\par 	  Claimant:	Normal:	\par Flexion Active	 135 	 135-degrees	\par Flexion Passive	 135	 135-degrees	\par Extension Active	 0-5	 0-5-degrees	\par Extension Passive	 0-5	 0-5-degrees	\par \par TTP over quadriceps origin at hip. No weakness to flexion/extension. No evidence of instability in the AP plane or varus or valgus stress. Negative Lachman. Negative pivot shift. Negative anterior drawer test. Negative posterior drawer test. Negative Sunni. Negative Apley grind. No medial or lateral joint line tenderness. No tenderness over the medial and lateral facet of the patella. No patellofemoral crepitations. No lateral tilting patella. No patellar apprehension. No crepitation in the medial and lateral femoral condyle. No proximal or distal swelling, edema or tenderness. No gross motor or sensory deficits. No intra-articular swelling. 2+ DP and PT pulses. No varus or valgus malalignment. Skin is intact. No rashes, scars or lesions. \par \par Right hip Exam\par \par ·	Skin: Clean/dry and intact\par ·	Inspection: No obvious deformity, no swelling.\par ·	Pulses: 2+ DP/PT pulses\par ·	ROM: 130 flexion without pain. Internal rotation to 15. External rotation to 45.\par ·	Painful ROM: None\par ·	Tenderness: No tenderness over greater trochanter/glut medius insertion. No groin pain. No ttp over the ASIS/Illiac crest.\par ·	Strength: 5/5 ADD/ABD/Q/H/TA/GS/EHL\par ·	Neuro: Sensation in tact to light touch throughout, DTR normal\par ·	Additional tests: Negative impingement test

## 2022-04-18 NOTE — ADDENDUM
[FreeTextEntry1] : Documented by Luis Enrique Joyce acting as a scribe for Dr. Gandara on 04/18/2022. \par \par All medical record entries made by the Scribe were at my, Dr. Gandara's, direction and\par personally dictated by me on 04/18/2022. I have reviewed the chart and agree that the record\par accurately reflects my personal performance of the history, physical exam, procedure and imaging.

## 2022-04-18 NOTE — DISCUSSION/SUMMARY
[de-identified] : SAMUEL BERMAN is a 26 year female being seen for f/u visit R knee pain. At last visit, patient was seeing progress in her ROM and strength, and allowed to return to work, training. However, soon after running, she felt buckling, and instability in knee. Thereafter, she was placed back in light duty. She has been doing PT, but denies graston at this time. She does complain of Right hip pain especially over lateral aspect of the hip. She denies numbness or tingling down to extremity. She has done PT for her hip without relief. Patient was given diclofenac at last visit given her hip and knee pain, but she reports moderate relief, and that’s only for short period of time. \par \par We had a thorough discussion regarding the nature of her pain, the pathophysiology, as well as all treatment options. On clinical exam, her IT is significantly tighter than at previous visit. Patient tried running a mile a few weeks ago, and was unable to complete 1 mile. Considering the patient's current presentation of pain being worse than prior to corticosteroid injection and failing on greater than 3 months of conservative measures, an MRI of hip and knee are indicated at this time to fully visualize IT band, and any underlying derangement. A prescription for this was given to the patient. We will go over the MRI results with her upon obtaining the results in the office and advise her of further treatment options. A prescription of Diclofenac was given and patient was informed about its risks and benefits. Patient was given prescription of formal physical therapy that she will perform 2x/wk for 6-8 wks. Depending on her MRI findings, we will consider further tx options such as cortisone injection, or Toradol injection, before starting surgical discussion. She agrees with the above plan and all questions were answered.\par \par Patient will continue light duty work at this time. \par \par

## 2022-04-24 ENCOUNTER — FORM ENCOUNTER (OUTPATIENT)
Age: 27
End: 2022-04-24

## 2022-04-27 ENCOUNTER — FORM ENCOUNTER (OUTPATIENT)
Age: 27
End: 2022-04-27

## 2022-04-28 ENCOUNTER — FORM ENCOUNTER (OUTPATIENT)
Age: 27
End: 2022-04-28

## 2022-05-09 ENCOUNTER — APPOINTMENT (OUTPATIENT)
Dept: ORTHOPEDIC SURGERY | Facility: CLINIC | Age: 27
End: 2022-05-09
Payer: OTHER MISCELLANEOUS

## 2022-05-09 PROCEDURE — 99442: CPT

## 2022-05-11 ENCOUNTER — RX RENEWAL (OUTPATIENT)
Age: 27
End: 2022-05-11

## 2022-05-11 RX ORDER — DICLOFENAC SODIUM 75 MG/1
75 TABLET, DELAYED RELEASE ORAL
Qty: 42 | Refills: 1 | Status: ACTIVE | COMMUNITY
Start: 2022-04-18 | End: 1900-01-01

## 2022-05-13 ENCOUNTER — NON-APPOINTMENT (OUTPATIENT)
Age: 27
End: 2022-05-13

## 2022-05-16 ENCOUNTER — APPOINTMENT (OUTPATIENT)
Age: 27
End: 2022-05-16
Payer: OTHER MISCELLANEOUS

## 2022-05-16 VITALS
DIASTOLIC BLOOD PRESSURE: 80 MMHG | OXYGEN SATURATION: 99 % | WEIGHT: 118 LBS | SYSTOLIC BLOOD PRESSURE: 122 MMHG | HEIGHT: 61 IN | BODY MASS INDEX: 22.28 KG/M2 | HEART RATE: 80 BPM

## 2022-05-16 PROCEDURE — 99213 OFFICE O/P EST LOW 20 MIN: CPT

## 2022-05-16 PROCEDURE — 99072 ADDL SUPL MATRL&STAF TM PHE: CPT

## 2022-05-16 NOTE — HISTORY OF PRESENT ILLNESS
[Stable] : stable [___ mths] : [unfilled] month(s) ago [4] : a current pain level of 4/10 [5] : an average pain level of 5/10 [Bending] : worsened by bending [Running] : worsened by running [Knee Flexion] : worsened with knee flexion [NSAIDs] : relieved by nonsteroidal anti-inflammatory drugs [Physical Therapy] : relieved by physical therapy [Rest] : relieved by rest [de-identified] : SAMUEL BERMAN is a 26 year female being seen for f/u visit R knee pain. Patient has seen limited progress relative to last visit. She endorses buckling and instability in knee joint. She has done PT, but reports mild to moderate improvement following it. She does complain of Right hip pain especially over lateral aspect of the hip. She denies numbness or tingling down to extremity. She has done PT for her hip without relief. She has been wearing OTC orthotics that she has d/c at this time. She presents for MRI review. \par  [FreeTextEntry3] : Running, climbing stairs, pivoting, squatting, jumping.  [FreeTextEntry4] : Cortisone inj (01/13/22) into IT band, mobic, physical therapy, cortisone inj (10/26/21) into IT band. Both cortisone injection provided her about a month of relief, although second one was less effective. With PT, she did have some relief with graston technique; however, relief was temporary. Mobic along with OTC acetaminophen provide short term symptomatic and pain relief, respectively.   [FreeTextEntry5] : N/A

## 2022-05-16 NOTE — DISCUSSION/SUMMARY
[de-identified] : SAMUEL BERMAN is a 26 year female being seen for f/u visit R knee pain. Patient has seen limited progress relative to last visit. She endorses buckling and instability in knee joint. She has done PT, but reports mild to moderate improvement following it. She does complain of Right hip pain especially over lateral aspect of the hip. She denies numbness or tingling down to extremity. She has done PT for her hip without relief. She has been wearing OTC orthotics that she has d/c at this time. She presents for MRI review. \par \par We had a thorough discussion regarding the nature of her pain, the pathophysiology, as well as all treatment options. On clinical exam, her IT is significantly tighter than at previous visit. I discussed operative and non-operative treatment modalities. Nonoperative treatment options range from cortisone injection, toradol injection, Prolotherapy, PT, HEP, and acetaminophen. Operative intervention such as IT band tenodesis, I advised against it given patient's occupation. At this time, I referred her for prolotherapy (Dr. Reno gupta). Patient was given prescription of formal physical therapy that she will perform 2x/wk for 6-8 wks. She will see us 4-6 wks after under going that therapy. She may call us if she wishes to proceed with toradol injection. All questions were answered and the patient verbalized understanding. The patient is in agreement with this treatment plan.

## 2022-05-16 NOTE — PHYSICAL EXAM
[de-identified] : Physical Exam:\par General: Well appearing, no acute distress, A&O\par Neurologic: A&Ox3, No focal deficits\par Head: NCAT without abrasions, lacerations, or ecchymosis to head, face, or scalp\par Eyes: No scleral icterus, no gross abnormalities\par Respiratory: Equal chest wall expansion bilaterally, no accessory muscle use\par Lymphatic: No lymphadenopathy palpated\par Skin: Warm and dry\par Psychiatric: Normal mood and affect\par \par Right Hip Exam\par \par Skin: Clean/dry and intact\par Inspection: No obvious deformity, no swelling.\par Pulses: 2+ DP/PT pulses\par ROM: 110 flexion without pain. Internal rotation to 15. External rotation to 35 with pain over greater troc.\par Painful ROM: None\par Tenderness: Positive tenderness over lateral femoral epicondyle. Otherwise no greater trochanter/glut medius insertion. No groin pain. No ttp over the ASIS/Illiac crest.\par Strength: 5/5 ADD/ABD/Q/H/TA/GS/EHL\par Neuro: Sensation in tact to light touch throughout, DTR normal\par Additional tests: Negative impingement test\par Pain with resisted hip abduction\par NAHED Test (Vinayak's Test): Negative\par FADIR Test (Labral Tear/HAYDE): Negative\par  \par Right Knee: Range of Motion in Degrees	\par 	  Claimant:	Normal:	\par Flexion Active	 135 	 135-degrees	\par Flexion Passive	 135	 135-degrees	\par Extension Active	 0-5	 0-5-degrees	\par Extension Passive	 0-5	 0-5-degrees	\par \par No effusion. Patellar crepitation noted on flexion. IT band TTP but significantly less than previous exam. Lateral JL TTP.  No tenderness over the tendon at this time. No weakness to flexion/extension. No Tenderness over the pes bursa. No evidence of instability in the AP plane or varus or valgus stress. Negative Lachman. Negative pivot shift. Negative anterior drawer test. positive posterior drawer test. positive Sunni. positive Apley grind. No medial joint line tenderness. No tenderness over the medial and lateral facet of the patella. No patellofemoral crepitations. No lateral tilting patella. No patella apprehension. No crepitation in the medial and lateral femoral condyle. No proximal or distal swelling, edema or tenderness. No gross motor or sensory deficits. No intra-articular swelling. Extra-articular swelling over the proximal medial aspect of the tibia. 2+ DP and PT pulses. No varus or valgus malalignment. Skin is intact. No rashes, scars or lesions. \par  \par Left Knee: Range of Motion in Degrees	\par 	  Claimant:	Normal:	\par Flexion Active	 135 	 135-degrees	\par Flexion Passive	 135	 135-degrees	\par Extension Active	 0-5	 0-5-degrees	\par Extension Passive	 0-5	 0-5-degrees	\par \par TTP over quadriceps origin at hip. No weakness to flexion/extension. No evidence of instability in the AP plane or varus or valgus stress. Negative Lachman. Negative pivot shift. Negative anterior drawer test. Negative posterior drawer test. Negative Sunni. Negative Apley grind. No medial or lateral joint line tenderness. No tenderness over the medial and lateral facet of the patella. No patellofemoral crepitations. No lateral tilting patella. No patellar apprehension. No crepitation in the medial and lateral femoral condyle. No proximal or distal swelling, edema or tenderness. No gross motor or sensory deficits. No intra-articular swelling. 2+ DP and PT pulses. No varus or valgus malalignment. Skin is intact. No rashes, scars or lesions. \par \par Right hip Exam\par \par ·	Skin: Clean/dry and intact\par ·	Inspection: No obvious deformity, no swelling.\par ·	Pulses: 2+ DP/PT pulses\par ·	ROM: 130 flexion without pain. Internal rotation to 15. External rotation to 45.\par ·	Painful ROM: None\par ·	Tenderness: No tenderness over greater trochanter/glut medius insertion. No groin pain. No ttp over the ASIS/Illiac crest.\par ·	Strength: 5/5 ADD/ABD/Q/H/TA/GS/EHL\par ·	Neuro: Sensation in tact to light touch throughout, DTR normal\par ·	Additional tests: Negative impingement test  [de-identified] : Procedure: MRI of Right hip \par Dated: 5/6/2022\par \par Impression:\par \par Trace right greater trochanteric bursitis. Unremarkable iliotibial band at the\par level of the pelvis and hip. The right hip joint is normal.\par \par Procedure: MRI of Right knee \par Dated: 5/6/22\par \par Impression:\par \par Findings suggestive of IT band friction syndrome. Correlate clinically.\par Small right knee joint effusion.\par Faint bone marrow edema in the lateral femoral condyle marginating the physeal scar compatible with stress reaction.\par Intact menisci, cruciate and collateral ligaments.\par  \par

## 2022-05-16 NOTE — ADDENDUM
[FreeTextEntry1] : Documented by Luis Enrique Joyce acting as a scribe for Dr. Gandara and Steve Bae PA-C on 05/16/2022. \par \par All medical record entries made by the Scribe were at my, Steve Bae's, direction and\par personally dictated by me on 05/16/2022. I have reviewed the chart and agree that the record\par accurately reflects my personal performance of the history, physical exam, procedure and imaging.

## 2022-06-15 ENCOUNTER — NON-APPOINTMENT (OUTPATIENT)
Age: 27
End: 2022-06-15

## 2022-06-20 ENCOUNTER — APPOINTMENT (OUTPATIENT)
Dept: ORTHOPEDIC SURGERY | Facility: CLINIC | Age: 27
End: 2022-06-20
Payer: OTHER MISCELLANEOUS

## 2022-06-20 PROCEDURE — 99072 ADDL SUPL MATRL&STAF TM PHE: CPT

## 2022-06-20 PROCEDURE — 99214 OFFICE O/P EST MOD 30 MIN: CPT

## 2022-06-20 NOTE — REVIEW OF SYSTEMS
[Joint Pain] : joint pain [Negative] : Heme/Lymph [Arthralgia] : arthralgia [FreeTextEntry9] : R knee

## 2022-06-20 NOTE — PHYSICAL EXAM
[de-identified] : Physical Exam:\par General: Well appearing, no acute distress, A&O\par Neurologic: A&Ox3, No focal deficits\par Head: NCAT without abrasions, lacerations, or ecchymosis to head, face, or scalp\par Eyes: No scleral icterus, no gross abnormalities\par Respiratory: Equal chest wall expansion bilaterally, no accessory muscle use\par Lymphatic: No lymphadenopathy palpated\par Skin: Warm and dry\par Psychiatric: Normal mood and affect\par \par Right Hip Exam\par \par Skin: Clean/dry and intact\par Inspection: No obvious deformity, no swelling.\par Pulses: 2+ DP/PT pulses\par ROM: 110 flexion without pain. Internal rotation to 15. External rotation to 35 with pain over greater troc.\par Painful ROM: None\par Tenderness: Positive tenderness over lateral femoral epicondyle. Otherwise no greater trochanter/glut medius insertion. No groin pain. No ttp over the ASIS/Illiac crest.\par Strength: 5/5 ADD/ABD/Q/H/TA/GS/EHL\par Neuro: Sensation in tact to light touch throughout, DTR normal\par Additional tests: Negative impingement test\par Pain with resisted hip abduction\par NAHED Test (Vinayak's Test): Negative\par FADIR Test (Labral Tear/HAYDE): Negative\par  \par Right Knee: Range of Motion in Degrees	\par 	  Claimant:	Normal:	\par Flexion Active	 135 	 135-degrees	\par Flexion Passive	 135	 135-degrees	\par Extension Active	 0-5	 0-5-degrees	\par Extension Passive	 0-5	 0-5-degrees	\par \par No effusion. Moderate to mild Patellar crepitation noted on flexion. IT band TTP but significantly softer than previous exam. No Lateral JL TTP.  No tenderness over the tendon at this time. No weakness to flexion/extension. No Tenderness over the pes bursa. No evidence of instability in the AP plane or varus or valgus stress. Negative Lachman. Negative pivot shift. Negative anterior drawer test. positive posterior drawer test. positive Sunni. positive Apley grind. No medial joint line tenderness. No tenderness over the medial and lateral facet of the patella. No patellofemoral crepitations. No lateral tilting patella. No patella apprehension. No crepitation in the medial and lateral femoral condyle. No proximal or distal swelling, edema or tenderness. No gross motor or sensory deficits. No intra-articular swelling. Extra-articular swelling over the proximal medial aspect of the tibia. 2+ DP and PT pulses. No varus or valgus malalignment. Skin is intact. No rashes, scars or lesions. \par  \par Left Knee: Range of Motion in Degrees	\par 	  Claimant:	Normal:	\par Flexion Active	 135 	 135-degrees	\par Flexion Passive	 135	 135-degrees	\par Extension Active	 0-5	 0-5-degrees	\par Extension Passive	 0-5	 0-5-degrees	\par \par TTP over quadriceps origin at hip. No weakness to flexion/extension. No evidence of instability in the AP plane or varus or valgus stress. Negative Lachman. Negative pivot shift. Negative anterior drawer test. Negative posterior drawer test. Negative Sunni. Negative Apley grind. No medial or lateral joint line tenderness. No tenderness over the medial and lateral facet of the patella. No patellofemoral crepitations. No lateral tilting patella. No patellar apprehension. No crepitation in the medial and lateral femoral condyle. No proximal or distal swelling, edema or tenderness. No gross motor or sensory deficits. No intra-articular swelling. 2+ DP and PT pulses. No varus or valgus malalignment. Skin is intact. No rashes, scars or lesions. \par \par Right hip Exam\par \par ·	Skin: Clean/dry and intact\par ·	Inspection: No obvious deformity, no swelling.\par ·	Pulses: 2+ DP/PT pulses\par ·	ROM: 130 flexion without pain. Internal rotation to 15. External rotation to 45.\par ·	Painful ROM: None\par ·	Tenderness: No tenderness over greater trochanter/glut medius insertion. No groin pain. No ttp over the ASIS/Illiac crest.\par ·	Strength: 5/5 ADD/ABD/Q/H/TA/GS/EHL\par ·	Neuro: Sensation in tact to light touch throughout, DTR normal\par ·	Additional tests: Negative impingement test  [de-identified] : Procedure: MRI of Right hip \par Dated: 5/6/2022\par \par Impression:\par \par Trace right greater trochanteric bursitis. Unremarkable iliotibial band at the\par level of the pelvis and hip. The right hip joint is normal.\par \par Procedure: MRI of Right knee \par Dated: 5/6/22\par \par Impression:\par \par Findings suggestive of IT band friction syndrome. Correlate clinically.\par Small right knee joint effusion.\par Faint bone marrow edema in the lateral femoral condyle marginating the physeal scar compatible with stress reaction.\par Intact menisci, cruciate and collateral ligaments.\par  \par

## 2022-06-20 NOTE — ADDENDUM
[FreeTextEntry1] : Documented by Luis Enrique Joyce acting as a scribe for Dr. Gandara and Steve Bae PA-C on 06/20/2022. \par \par All medical record entries made by the Scribe were at my, Dr. Gandara, and Steve Bae's, direction and\par personally dictated by me on 06/20/2022. I have reviewed the chart and agree that the record\par accurately reflects my personal performance of the history, physical exam, procedure and imaging.

## 2022-06-20 NOTE — HISTORY OF PRESENT ILLNESS
[Stable] : stable [___ mths] : [unfilled] month(s) ago [4] : a current pain level of 4/10 [5] : an average pain level of 5/10 [Bending] : worsened by bending [Running] : worsened by running [Knee Flexion] : worsened with knee flexion [NSAIDs] : relieved by nonsteroidal anti-inflammatory drugs [Physical Therapy] : relieved by physical therapy [Rest] : relieved by rest [de-identified] : SAMUEL BERMAN is a 26 year female being seen for f/u visit R knee pain. At last visit, I advised patient to undergo prolotherapy by Dr. Bruner. She did this on Marley 3. Currently, she reports mild improvement in pain at this time. She is able to walk with mild to no pain at this time. She denies numbness or tingling down to extremity. She is doing PT at [facility], and noticing improvement in IT band, and surrounding muscles. \par  [FreeTextEntry3] : Running, climbing stairs, pivoting, squatting, jumping.  [FreeTextEntry4] : Cortisone inj (01/13/22) into IT band, mobic, physical therapy, cortisone inj (10/26/21) into IT band. Both cortisone injection provided her about a month of relief, although second one was less effective. With PT, she did have some relief with graston technique; however, relief was temporary. Mobic along with OTC acetaminophen provide short term symptomatic and pain relief, respectively.   [FreeTextEntry5] : N/A

## 2022-06-20 NOTE — DISCUSSION/SUMMARY
[de-identified] : SAMUEL BERMAN is a 26 year female being seen for f/u visit R knee pain. At last visit, I advised patient to undergo prolotherapy by Dr. Bruner. She did this on Marley 3. Currently, she reports mild improvement in pain at this time. She is able to walk with mild to no pain at this time. She denies numbness or tingling down to extremity. \par \par On clinical exam, her IT band is significantly softer than last visit. She did try running at this time, and was able to successfully complete a 1 mile run. We had a thorough discussion regarding the nature of her pain, the pathophysiology, as well as all treatment options. I spoke to Dr. Bruner too, who said sometimes results of prolotherapy do take upward of 6 wks. He also talked about potential booster shot. I discussed operative and non-operative treatment modalities. I discuss possibility of toradol injection prior to her running test. At this time, I advised her to see Dr. Bruner in 2 wks. Patient was given prescription of formal physical therapy that she will perform 2x/wk for 6-8 wks. Patient will follow up in 3-4 wks for repeat clinical assessment. She may follow up for toradol injection if deem necessary, or clearance. All questions were answered and the patient verbalized understanding. The patient is in agreement with this treatment plan. \par \par

## 2022-07-07 ENCOUNTER — RX ONLY (RX ONLY)
Age: 27
End: 2022-07-07

## 2022-07-07 ENCOUNTER — OFFICE (OUTPATIENT)
Dept: URBAN - METROPOLITAN AREA CLINIC 104 | Facility: CLINIC | Age: 27
Setting detail: OPHTHALMOLOGY
End: 2022-07-07
Payer: COMMERCIAL

## 2022-07-07 DIAGNOSIS — H00.11: ICD-10-CM

## 2022-07-07 PROCEDURE — 92002 INTRM OPH EXAM NEW PATIENT: CPT | Performed by: OPTOMETRIST

## 2022-07-07 ASSESSMENT — CONFRONTATIONAL VISUAL FIELD TEST (CVF)
OS_FINDINGS: FULL
OD_FINDINGS: FULL

## 2022-07-07 ASSESSMENT — VISUAL ACUITY
OS_BCVA: 20/40-2
OD_BCVA: 20/40-3

## 2022-07-07 ASSESSMENT — LID EXAM ASSESSMENTS
OS_BLEPHARITIS: 2+
OD_BLEPHARITIS: 2+

## 2022-07-07 ASSESSMENT — TONOMETRY: OD_IOP_MMHG: 18

## 2022-07-10 ENCOUNTER — FORM ENCOUNTER (OUTPATIENT)
Age: 27
End: 2022-07-10

## 2022-07-13 ENCOUNTER — RX ONLY (RX ONLY)
Age: 27
End: 2022-07-13

## 2022-07-13 ENCOUNTER — OFFICE (OUTPATIENT)
Dept: URBAN - METROPOLITAN AREA CLINIC 104 | Facility: CLINIC | Age: 27
Setting detail: OPHTHALMOLOGY
End: 2022-07-13
Payer: COMMERCIAL

## 2022-07-13 DIAGNOSIS — H00.11: ICD-10-CM

## 2022-07-13 PROBLEM — H01.00 BLEPHARITIS; RIGHT UPPER LID, RIGHT LOWER LID, LEFT UPPER LID, LEFT LOWER LID: Status: ACTIVE | Noted: 2022-07-13

## 2022-07-13 PROCEDURE — 92285 EXTERNAL OCULAR PHOTOGRAPHY: CPT | Performed by: OPHTHALMOLOGY

## 2022-07-13 PROCEDURE — 67800 REMOVE EYELID LESION: CPT | Performed by: OPHTHALMOLOGY

## 2022-07-13 ASSESSMENT — LID EXAM ASSESSMENTS
OS_BLEPHARITIS: 2+
OD_BLEPHARITIS: 2+

## 2022-07-13 ASSESSMENT — VISUAL ACUITY
OS_BCVA: 20/40-2
OD_BCVA: 20/40-2

## 2022-07-13 ASSESSMENT — CONFRONTATIONAL VISUAL FIELD TEST (CVF)
OD_FINDINGS: FULL
OS_FINDINGS: FULL

## 2022-07-18 ENCOUNTER — FORM ENCOUNTER (OUTPATIENT)
Age: 27
End: 2022-07-18

## 2022-07-18 ENCOUNTER — APPOINTMENT (OUTPATIENT)
Dept: ORTHOPEDIC SURGERY | Facility: CLINIC | Age: 27
End: 2022-07-18

## 2022-07-18 PROCEDURE — 99072 ADDL SUPL MATRL&STAF TM PHE: CPT

## 2022-07-18 PROCEDURE — 99214 OFFICE O/P EST MOD 30 MIN: CPT

## 2022-07-18 NOTE — HISTORY OF PRESENT ILLNESS
[Stable] : stable [___ mths] : [unfilled] month(s) ago [4] : a current pain level of 4/10 [5] : an average pain level of 5/10 [Bending] : worsened by bending [Running] : worsened by running [Knee Flexion] : worsened with knee flexion [NSAIDs] : relieved by nonsteroidal anti-inflammatory drugs [Physical Therapy] : relieved by physical therapy [Rest] : relieved by rest [de-identified] : SAMUEL BERMAN is a 26 year female being seen for f/u visit R knee pain. At last visit, I advised patient to undergo prolotherapy by Dr. Bruner, but did not go. Currently, she reports mild improvement in pain at this time. She is able to walk with mild to no pain at this time. She denies numbness or tingling down to extremity. She is not doing PT because it was not authorized by workers comp. She saw Dr. dudley at [facility] who suggested diagnostic surgery.\par  [FreeTextEntry3] : Running, climbing stairs, pivoting, squatting, jumping.  [FreeTextEntry4] : Cortisone inj (01/13/22) into IT band, mobic, physical therapy, cortisone inj (10/26/21) into IT band. Both cortisone injection provided her about a month of relief, although second one was less effective. With PT, she did have some relief with graston technique; however, relief was temporary. Mobic along with OTC acetaminophen provide short term symptomatic and pain relief, respectively.   [FreeTextEntry5] : N/A

## 2022-07-18 NOTE — REVIEW OF SYSTEMS
[Arthralgia] : arthralgia [Joint Pain] : joint pain [Negative] : Heme/Lymph [FreeTextEntry9] : R knee

## 2022-07-18 NOTE — ADDENDUM
[FreeTextEntry1] : Documented by Nuvia Leyva acting as a scribe for Dr. Gandara and Steve Bae PA-C on 07/18/2022. \par \par All medical record entries made by the Scribe were at my, Dr. Gandara, and Steve Bae's, direction and\par personally dictated by me on 07/18/2022. I have reviewed the chart and agree that the record\par accurately reflects my personal performance of the history, physical exam, procedure and imaging.

## 2022-07-18 NOTE — PHYSICAL EXAM
[de-identified] : Physical Exam:\par General: Well appearing, no acute distress, A&O\par Neurologic: A&Ox3, No focal deficits\par Head: NCAT without abrasions, lacerations, or ecchymosis to head, face, or scalp\par Eyes: No scleral icterus, no gross abnormalities\par Respiratory: Equal chest wall expansion bilaterally, no accessory muscle use\par Lymphatic: No lymphadenopathy palpated\par Skin: Warm and dry\par Psychiatric: Normal mood and affect\par \par Right Hip Exam\par \par Skin: Clean/dry and intact\par Inspection: No obvious deformity, no swelling.\par Pulses: 2+ DP/PT pulses\par ROM: 110 flexion without pain. Internal rotation to 15. External rotation to 35 with pain over greater troc.\par Painful ROM: None\par Tenderness: Positive tenderness over lateral femoral epicondyle. Otherwise no greater trochanter/glut medius insertion. No groin pain. No ttp over the ASIS/Illiac crest.\par Strength: 5/5 ADD/ABD/Q/H/TA/GS/EHL\par Neuro: Sensation in tact to light touch throughout, DTR normal\par Additional tests: Negative impingement test\par Pain with resisted hip abduction\par NAHED Test (Vinayak's Test): Negative\par FADIR Test (Labral Tear/HAYDE): Negative\par  \par Right Knee: Range of Motion in Degrees	\par 	  Claimant:	Normal:	\par Flexion Active	 135 	 135-degrees	\par Flexion Passive	 135	 135-degrees	\par Extension Active	 0-5	 0-5-degrees	\par Extension Passive	 0-5	 0-5-degrees	\par \par No effusion. Moderate to mild Patellar crepitation noted on flexion. IT band TTP but significantly softer than previous exam. No Lateral JL TTP.  No tenderness over the tendon at this time. No weakness to flexion/extension. No Tenderness over the pes bursa. No evidence of instability in the AP plane or varus or valgus stress. Negative Lachman. Negative pivot shift. Negative anterior drawer test. positive posterior drawer test. positive Sunni. positive Apley grind. no medial or lateral joint line tenderness. No tenderness over the medial and lateral facet of the patella. No patellofemoral crepitations. No lateral tilting patella. No patella apprehension. No crepitation in the medial and lateral femoral condyle. No proximal or distal swelling, edema or tenderness. No gross motor or sensory deficits. No intra-articular swelling. Extra-articular swelling over the proximal medial aspect of the tibia. 2+ DP and PT pulses. No varus or valgus malalignment. Skin is intact. No rashes, scars or lesions.  Tenderness fibular head and femoral condyle.\par  \par Left Knee: Range of Motion in Degrees	\par 	  Claimant:	Normal:	\par Flexion Active	 135 	 135-degrees	\par Flexion Passive	 135	 135-degrees	\par Extension Active	 0-5	 0-5-degrees	\par Extension Passive	 0-5	 0-5-degrees	\par \par TTP over quadriceps origin at hip. No weakness to flexion/extension. No evidence of instability in the AP plane or varus or valgus stress. Negative Lachman. Negative pivot shift. Negative anterior drawer test. Negative posterior drawer test. Negative Sunni. Negative Apley grind. No medial or lateral joint line tenderness. No tenderness over the medial and lateral facet of the patella. No patellofemoral crepitations. No lateral tilting patella. No patellar apprehension. No crepitation in the medial and lateral femoral condyle. No proximal or distal swelling, edema or tenderness. No gross motor or sensory deficits. No intra-articular swelling. 2+ DP and PT pulses. No varus or valgus malalignment. Skin is intact. No rashes, scars or lesions. \par \par Right hip Exam\par \par ·	Skin: Clean/dry and intact\par ·	Inspection: No obvious deformity, no swelling.\par ·	Pulses: 2+ DP/PT pulses\par ·	ROM: 130 flexion without pain. Internal rotation to 15. External rotation to 45.\par ·	Painful ROM: None\par ·	Tenderness: No tenderness over greater trochanter/glut medius insertion. No groin pain. No ttp over the ASIS/Illiac crest.\par ·	Strength: 5/5 ADD/ABD/Q/H/TA/GS/EHL\par ·	Neuro: Sensation in tact to light touch throughout, DTR normal\par ·	Additional tests: Negative impingement test  [de-identified] : Procedure: MRI of Right hip \par Dated: 5/6/2022\par \par Impression:\par \par Trace right greater trochanteric bursitis. Unremarkable iliotibial band at the\par level of the pelvis and hip. The right hip joint is normal.\par \par Procedure: MRI of Right knee \par Dated: 5/6/22\par \par Impression:\par \par Findings suggestive of IT band friction syndrome. Correlate clinically.\par Small right knee joint effusion.\par Faint bone marrow edema in the lateral femoral condyle marginating the physeal scar compatible with stress reaction.\par Intact menisci, cruciate and collateral ligaments.\par  \par

## 2022-07-18 NOTE — DISCUSSION/SUMMARY
[de-identified] : SAMUEL BERMAN is a 26 year female being seen for f/u visit R knee pain. At last visit, I advised patient to undergo prolotherapy by Dr. Brunre, but did not go. Currently, she reports mild improvement in pain at this time. She is able to walk with mild to no pain at this time. She denies numbness or tingling down to extremity. She is not doing PT because it was not authorized by workers comp. She saw Dr. dudley at [facility] who suggested diagnostic surgery. \par \par \par We had a thorough discussion regarding the nature of her pain, the pathophysiology, as well as all treatment options. I discussed operative and non-operative treatment modalities. Operative treatment options include diagnostic arthroscopic z lengthening, but it might not provide relief. Pt wishes to change PT location at this time. Patient was given prescription of formal physical therapy that she will perform 2x/wk for 6-8 wks. Advised acupuncture, script provided. I will consult with my partners and speak with patient about possible plans. All questions were answered and the patient verbalized understanding. The patient is in agreement with this treatment plan.

## 2022-08-03 ENCOUNTER — FORM ENCOUNTER (OUTPATIENT)
Age: 27
End: 2022-08-03

## 2022-08-08 ENCOUNTER — FORM ENCOUNTER (OUTPATIENT)
Age: 27
End: 2022-08-08

## 2022-08-29 ENCOUNTER — NON-APPOINTMENT (OUTPATIENT)
Age: 27
End: 2022-08-29

## 2022-08-30 ENCOUNTER — FORM ENCOUNTER (OUTPATIENT)
Age: 27
End: 2022-08-30

## 2022-09-19 ENCOUNTER — APPOINTMENT (OUTPATIENT)
Dept: ORTHOPEDIC SURGERY | Facility: CLINIC | Age: 27
End: 2022-09-19

## 2022-09-19 VITALS — HEIGHT: 61 IN | BODY MASS INDEX: 22.28 KG/M2 | WEIGHT: 118 LBS

## 2022-09-19 PROCEDURE — 99072 ADDL SUPL MATRL&STAF TM PHE: CPT

## 2022-09-19 PROCEDURE — 99214 OFFICE O/P EST MOD 30 MIN: CPT

## 2022-09-20 NOTE — ADDENDUM
[FreeTextEntry1] : Documented by Nuvia Leyva acting as a scribe for Dr. Gandara and Steve Bae PA-C on 09/19/2022. \par \par All medical record entries made by the Scribe were at my, Dr. Gandara, and Steve Bae's, direction and\par personally dictated by me on 09/19/2022. I have reviewed the chart and agree that the record\par accurately reflects my personal performance of the history, physical exam, procedure and imaging.

## 2022-09-20 NOTE — DISCUSSION/SUMMARY
[de-identified] : ASMUEL BERMAN is a 26 year female being seen for f/u visit R knee pain. She has been doing physical therapy. She has tried running. She states there has been some improvement since her injury. She was approved for acupuncture, but has not gone yet. Patient denies any recent fevers, chills or night sweats. Patient denies any radiating pain, numbness, tingling sensations, burning sensations, urinary or bowel incontinence. \par \par We had a thorough discussion regarding the nature of her pain, the pathophysiology, as well as all treatment options. I discussed operative and non-operative treatment modalities. We discussed surgery for IT band tendinitis. She would like to continue conservative treatment at this time. Conservative measures of treatment include rest until asymptomatic, activity avoidance, NSAID's PRN, application to ice to the area 2-3x daily for 20 minutes, with gradual return to activities. Patient was given prescription of formal physical therapy that she will perform 2x/wk for 6-8 wks. She was also given a script for acupuncture. Patient will follow up in 6 wks for repeat clinical assessment.  All questions were answered and the patient verbalized understanding. The patient is in agreement with this treatment plan. \par

## 2022-09-20 NOTE — HISTORY OF PRESENT ILLNESS
[Worsening] : worsening [___ mths] : [unfilled] month(s) ago [4] : a current pain level of 4/10 [5] : an average pain level of 5/10 [Bending] : worsened by bending [Running] : worsened by running [Knee Flexion] : worsened with knee flexion [NSAIDs] : relieved by nonsteroidal anti-inflammatory drugs [Physical Therapy] : relieved by physical therapy [Rest] : relieved by rest [de-identified] : SAMUEL EBRMAN is a 26 year female being seen for f/u visit R knee pain. She has been doing physical therapy. She has tried running. She states there has been some improvement since her injury. She was approved for acupuncture, but has not gone yet. Patient denies any recent fevers, chills or night sweats. Patient denies any radiating pain, numbness, tingling sensations, burning sensations, urinary or bowel incontinence.  [FreeTextEntry3] : Running, climbing stairs, pivoting, squatting, jumping.  [FreeTextEntry4] : Cortisone inj (01/13/22) into IT band, mobic, physical therapy, cortisone inj (10/26/21) into IT band. Both cortisone injection provided her about a month of relief, although second one was less effective. With PT, she did have some relief with graston technique; however, relief was temporary. Mobic along with OTC acetaminophen provide short term symptomatic and pain relief, respectively.   [FreeTextEntry5] : N/A

## 2022-09-20 NOTE — PHYSICAL EXAM
[de-identified] : Physical Exam:\par General: Well appearing, no acute distress, A&O\par Neurologic: A&Ox3, No focal deficits\par Head: NCAT without abrasions, lacerations, or ecchymosis to head, face, or scalp\par Eyes: No scleral icterus, no gross abnormalities\par Respiratory: Equal chest wall expansion bilaterally, no accessory muscle use\par Lymphatic: No lymphadenopathy palpated\par Skin: Warm and dry\par Psychiatric: Normal mood and affect\par \par Right Hip Exam\par \par Skin: Clean/dry and intact\par Inspection: No obvious deformity, no swelling.\par Pulses: 2+ DP/PT pulses\par ROM: 110 flexion without pain. Internal rotation to 15. External rotation to 35 with pain over greater troc.\par Painful ROM: None\par Tenderness: Positive tenderness over lateral femoral epicondyle. Otherwise no greater trochanter/glut medius insertion. No groin pain. No ttp over the ASIS/Illiac crest.\par Strength: 5/5 ADD/ABD/Q/H/TA/GS/EHL\par Neuro: Sensation in tact to light touch throughout, DTR normal\par Additional tests: Negative impingement test\par Pain with resisted hip abduction\par NAHED Test (Vinayak's Test): Negative\par FADIR Test (Labral Tear/HAYDE): Negative\par  \par Right Knee: Range of Motion in Degrees	\par 	  Claimant:	Normal:	\par Flexion Active	 135 	 135-degrees	\par Flexion Passive	 135	 135-degrees	\par Extension Active	 0-5	 0-5-degrees	\par Extension Passive	 0-5	 0-5-degrees	\par \par No effusion. Moderate to mild Patellar crepitation noted on flexion. IT band TTP but significantly softer than previous exam. No Lateral JL TTP.  No tenderness over the tendon at this time. No weakness to flexion/extension. No Tenderness over the pes bursa. No evidence of instability in the AP plane or varus or valgus stress. Negative Lachman. Negative pivot shift. Negative anterior drawer test. positive posterior drawer test. positive Sunni. positive Apley grind. no medial or lateral joint line tenderness. No tenderness over the medial and lateral facet of the patella. No patellofemoral crepitations. No lateral tilting patella. No patella apprehension. No crepitation in the medial and lateral femoral condyle. No proximal or distal swelling, edema or tenderness. No gross motor or sensory deficits. No intra-articular swelling. Extra-articular swelling over the proximal medial aspect of the tibia. 2+ DP and PT pulses. No varus or valgus malalignment. Skin is intact. No rashes, scars or lesions.  Tenderness fibular head and femoral condyle.\par  \par Left Knee: Range of Motion in Degrees	\par 	  Claimant:	Normal:	\par Flexion Active	 135 	 135-degrees	\par Flexion Passive	 135	 135-degrees	\par Extension Active	 0-5	 0-5-degrees	\par Extension Passive	 0-5	 0-5-degrees	\par \par TTP over quadriceps origin at hip. No weakness to flexion/extension. No evidence of instability in the AP plane or varus or valgus stress. Negative Lachman. Negative pivot shift. Negative anterior drawer test. Negative posterior drawer test. Negative Sunni. Negative Apley grind. No medial or lateral joint line tenderness. No tenderness over the medial and lateral facet of the patella. No patellofemoral crepitations. No lateral tilting patella. No patellar apprehension. No crepitation in the medial and lateral femoral condyle. No proximal or distal swelling, edema or tenderness. No gross motor or sensory deficits. No intra-articular swelling. 2+ DP and PT pulses. No varus or valgus malalignment. Skin is intact. No rashes, scars or lesions. \par  [de-identified] : Procedure: MRI of Right hip \par Dated: 5/6/2022\par \par Impression:\par \par Trace right greater trochanteric bursitis. Unremarkable iliotibial band at the\par level of the pelvis and hip. The right hip joint is normal.\par \par Procedure: MRI of Right knee \par Dated: 5/6/22\par \par Impression:\par \par Findings suggestive of IT band friction syndrome. Correlate clinically.\par Small right knee joint effusion.\par Faint bone marrow edema in the lateral femoral condyle marginating the physeal scar compatible with stress reaction.\par Intact menisci, cruciate and collateral ligaments.\par  \par

## 2022-10-17 ENCOUNTER — APPOINTMENT (OUTPATIENT)
Dept: ORTHOPEDIC SURGERY | Facility: CLINIC | Age: 27
End: 2022-10-17

## 2022-10-17 ENCOUNTER — FORM ENCOUNTER (OUTPATIENT)
Age: 27
End: 2022-10-17

## 2022-10-17 PROCEDURE — 99072 ADDL SUPL MATRL&STAF TM PHE: CPT

## 2022-10-17 PROCEDURE — 99214 OFFICE O/P EST MOD 30 MIN: CPT

## 2022-10-17 NOTE — HISTORY OF PRESENT ILLNESS
[Bending] : worsened by bending [Running] : worsened by running [Knee Flexion] : worsened with knee flexion [NSAIDs] : relieved by nonsteroidal anti-inflammatory drugs [Physical Therapy] : relieved by physical therapy [Rest] : relieved by rest [de-identified] : SAMUEL BERMAN is a 27 year female being seen for f/u visit R knee pain. She has been doing physical therapy and acupuncture. She tried running 5 laps, but stopped due to pain on the last lap. She describes the pain as sharp. She is seeing some progress, but it is slow. Patient denies any recent fevers, chills or night sweats. Patient denies any radiating pain, numbness, tingling sensations, burning sensations, urinary or bowel incontinence.  [FreeTextEntry3] : Running, climbing stairs, pivoting, squatting, jumping.  [FreeTextEntry4] : Cortisone inj (01/13/22) into IT band, mobic, physical therapy, cortisone inj (10/26/21) into IT band. Both cortisone injection provided her about a month of relief, although second one was less effective. With PT, she did have some relief with graston technique; however, relief was temporary. Mobic along with OTC acetaminophen provide short term symptomatic and pain relief, respectively.   [FreeTextEntry5] : N/A

## 2022-10-17 NOTE — ADDENDUM
[FreeTextEntry1] : Documented by Nuvia Leyva acting as a scribe for Dr. Gandara and Steve Bae PA-C on 10/17/2022. \par \par All medical record entries made by the Scribe were at my, Dr. Gandara, and Steve Bae's, direction and\par personally dictated by me on 10/17/2022. I have reviewed the chart and agree that the record\par accurately reflects my personal performance of the history, physical exam, procedure and imaging.

## 2022-10-17 NOTE — DISCUSSION/SUMMARY
[de-identified] : SAMUEL BERMAN is a 27 year female being seen for f/u visit R knee pain. She has been doing physical therapy and acupuncture. She tried running 5 laps, but stopped due to pain on the last lap. She describes the pain as sharp. She is seeing some progress, but it is slow. Patient denies any recent fevers, chills or night sweats. Patient denies any radiating pain, numbness, tingling sensations, burning sensations, urinary or bowel incontinence. \par \par We had a thorough discussion regarding the nature of her pain, the pathophysiology, as well as all treatment options for right IT band tendinitis and right pes anserine bursitis. At this time, she is seeing gradual improvement and she should continue physical therapy, acupuncture, and home exercises. Patient was given prescription of formal physical therapy that she will perform 2x/wk for 6-8 wks. Conservative measures of treatment include rest until asymptomatic, activity avoidance, NSAID's PRN, application to ice to the area 2-3x daily for 20 minutes, with gradual return to activities. Patient will follow up in 6-8 wks for repeat clinical assessment. All questions were answered and the patient verbalized understanding. The patient is in agreement with this treatment plan.

## 2022-10-17 NOTE — PHYSICAL EXAM
[de-identified] : Physical Exam:\par General: Well appearing, no acute distress, A&O\par Neurologic: A&Ox3, No focal deficits\par Head: NCAT without abrasions, lacerations, or ecchymosis to head, face, or scalp\par Eyes: No scleral icterus, no gross abnormalities\par Respiratory: Equal chest wall expansion bilaterally, no accessory muscle use\par Lymphatic: No lymphadenopathy palpated\par Skin: Warm and dry\par Psychiatric: Normal mood and affect\par \par Right Hip Exam\par \par Skin: Clean/dry and intact\par Inspection: No obvious deformity, no swelling.\par Pulses: 2+ DP/PT pulses\par ROM: 110 flexion without pain. Internal rotation to 15. External rotation to 35 with pain over greater troc.\par Painful ROM: None\par Tenderness: Positive tenderness over lateral femoral epicondyle. Otherwise no greater trochanter/glut medius insertion. No groin pain. No ttp over the ASIS/Illiac crest.\par Strength: 5/5 ADD/ABD/Q/H/TA/GS/EHL\par Neuro: Sensation in tact to light touch throughout, DTR normal\par Additional tests: Negative impingement test\par Pain with resisted hip abduction\par NAHED Test (Vinayak's Test): Negative\par FADIR Test (Labral Tear/HAYDE): Negative\par  \par Right Knee: Range of Motion in Degrees	\par 	  Claimant:	Normal:	\par Flexion Active	 135 	 135-degrees	\par Flexion Passive	 135	 135-degrees	\par Extension Active	 0-5	 0-5-degrees	\par Extension Passive	 0-5	 0-5-degrees	\par \par No effusion. Moderate to mild Patellar crepitation noted on flexion. IT band TTP but significantly softer than previous exam. No Lateral JL TTP.  No tenderness over the tendon at this time. No weakness to flexion/extension. No Tenderness over the pes bursa. No evidence of instability in the AP plane or varus or valgus stress. Negative Lachman. Negative pivot shift. Negative anterior drawer test. positive posterior drawer test. positive Sunni. positive Apley grind. no medial or lateral joint line tenderness. No tenderness over the medial and lateral facet of the patella. No patellofemoral crepitations. No lateral tilting patella. No patella apprehension. No crepitation in the medial and lateral femoral condyle. No proximal or distal swelling, edema or tenderness. No gross motor or sensory deficits. No intra-articular swelling. Extra-articular swelling over the proximal medial aspect of the tibia. 2+ DP and PT pulses. No varus or valgus malalignment. Skin is intact. No rashes, scars or lesions.  Tenderness fibular head and femoral condyle.\par  \par Left Knee: Range of Motion in Degrees	\par 	  Claimant:	Normal:	\par Flexion Active	 135 	 135-degrees	\par Flexion Passive	 135	 135-degrees	\par Extension Active	 0-5	 0-5-degrees	\par Extension Passive	 0-5	 0-5-degrees	\par \par TTP over quadriceps origin at hip. No weakness to flexion/extension. No evidence of instability in the AP plane or varus or valgus stress. Negative Lachman. Negative pivot shift. Negative anterior drawer test. Negative posterior drawer test. Negative Sunni. Negative Apley grind. No medial or lateral joint line tenderness. No tenderness over the medial and lateral facet of the patella. No patellofemoral crepitations. No lateral tilting patella. No patellar apprehension. No crepitation in the medial and lateral femoral condyle. No proximal or distal swelling, edema or tenderness. No gross motor or sensory deficits. No intra-articular swelling. 2+ DP and PT pulses. No varus or valgus malalignment. Skin is intact. No rashes, scars or lesions. \par  [de-identified] : Procedure: MRI of Right hip \par Dated: 5/6/2022\par \par Impression:\par \par Trace right greater trochanteric bursitis. Unremarkable iliotibial band at the\par level of the pelvis and hip. The right hip joint is normal.\par \par Procedure: MRI of Right knee \par Dated: 5/6/22\par \par Impression:\par \par Findings suggestive of IT band friction syndrome. Correlate clinically.\par Small right knee joint effusion.\par Faint bone marrow edema in the lateral femoral condyle marginating the physeal scar compatible with stress reaction.\par Intact menisci, cruciate and collateral ligaments.\par  \par

## 2022-10-19 ENCOUNTER — FORM ENCOUNTER (OUTPATIENT)
Age: 27
End: 2022-10-19

## 2022-11-21 ENCOUNTER — APPOINTMENT (OUTPATIENT)
Dept: ORTHOPEDIC SURGERY | Facility: CLINIC | Age: 27
End: 2022-11-21

## 2022-11-21 DIAGNOSIS — M70.50 OTHER BURSITIS OF KNEE, UNSPECIFIED KNEE: ICD-10-CM

## 2022-11-21 PROCEDURE — 99214 OFFICE O/P EST MOD 30 MIN: CPT

## 2022-11-21 PROCEDURE — 99072 ADDL SUPL MATRL&STAF TM PHE: CPT

## 2022-11-21 NOTE — ADDENDUM
[FreeTextEntry1] : Documented by Lesli Messina acting as a scribe for Dr. Gandara and Steve Bae PA-C on 11/21/2022.   All medical record entries made by the Scribe were at my, Dr. Gandara, and Steve Bae's, direction and personally dictated by me on 11/21/2022. I have reviewed the chart and agree that the record accurately reflects my personal performance of the history, physical exam, procedure and imaging.

## 2022-11-21 NOTE — HISTORY OF PRESENT ILLNESS
[Bending] : worsened by bending [Running] : worsened by running [Knee Flexion] : worsened with knee flexion [NSAIDs] : relieved by nonsteroidal anti-inflammatory drugs [Physical Therapy] : relieved by physical therapy [Rest] : relieved by rest [de-identified] : SAMUEL BERMAN is a 27 year female being seen for f/u visit R knee pain. She has been doing physical therapy and acupuncture. She is seeing some progress, but it is slow. She states that she has tried running yesterday and was able to run 1 mile. She notes she is still unable to run 1.5 miles straight through. Patient denies any recent fevers, chills or night sweats. Patient denies any radiating pain, numbness, tingling sensations, burning sensations, urinary or bowel incontinence.  [FreeTextEntry3] : Running, climbing stairs, pivoting, squatting, jumping.  [FreeTextEntry4] : Cortisone inj (01/13/22) into IT band, mobic, physical therapy, cortisone inj (10/26/21) into IT band. Both cortisone injection provided her about a month of relief, although second one was less effective. With PT, she did have some relief with graston technique; however, relief was temporary. Mobic along with OTC acetaminophen provide short term symptomatic and pain relief, respectively.   [FreeTextEntry5] : N/A

## 2022-11-21 NOTE — DISCUSSION/SUMMARY
[de-identified] : SAMUEL BERMAN is a 27 year female being seen for f/u visit R knee pain. She has been doing physical therapy and acupuncture. She is seeing some progress, but it is slow. She states that she has tried running yesterday and was able to run 1 mile. She notes she is still unable to run 1.5 miles straight through. Patient denies any recent fevers, chills or night sweats. Patient denies any radiating pain, numbness, tingling sensations, burning sensations, urinary or bowel incontinence. \par \par We had a thorough discussion regarding the nature of her pain, the pathophysiology, as well as all treatment options. I recommended her to continue with physical therapy and acupuncture. Patient was given prescription of formal physical therapy that she will perform 2x/wk for 6-8 wks as well as a prescription for acupuncture.  Patient will follow up in 6-8 wks for repeat clinical assessment. All questions were answered and the patient verbalized understanding. The patient is in agreement with this treatment plan.

## 2022-11-21 NOTE — PHYSICAL EXAM
[de-identified] : Physical Exam:\par General: Well appearing, no acute distress, A&O\par Neurologic: A&Ox3, No focal deficits\par Head: NCAT without abrasions, lacerations, or ecchymosis to head, face, or scalp\par Eyes: No scleral icterus, no gross abnormalities\par Respiratory: Equal chest wall expansion bilaterally, no accessory muscle use\par Lymphatic: No lymphadenopathy palpated\par Skin: Warm and dry\par Psychiatric: Normal mood and affect\par \par Right Hip Exam\par \par Skin: Clean/dry and intact\par Inspection: No obvious deformity, no swelling.\par Pulses: 2+ DP/PT pulses\par ROM: 110 flexion without pain. Internal rotation to 15. External rotation to 35 with pain over greater troc.\par Painful ROM: None\par Tenderness: Positive tenderness over lateral femoral epicondyle. Otherwise no greater trochanter/glut medius insertion. No groin pain. No ttp over the ASIS/Illiac crest.\par Strength: 5/5 ADD/ABD/Q/H/TA/GS/EHL\par Neuro: Sensation in tact to light touch throughout, DTR normal\par Additional tests: Negative impingement test\par Pain with resisted hip abduction\par NAHED Test (Vinayak's Test): Negative\par FADIR Test (Labral Tear/HAYDE): Negative\par  \par Right Knee: Range of Motion in Degrees	\par 	  Claimant:	Normal:	\par Flexion Active	 135 	 135-degrees	\par Flexion Passive	 135	 135-degrees	\par Extension Active	 0-5	 0-5-degrees	\par Extension Passive	 0-5	 0-5-degrees	\par \par No effusion. Moderate to mild Patellar crepitation noted on flexion. IT band TTP but significantly softer than previous exam. No Lateral JL TTP.  No tenderness over the tendon at this time. No weakness to flexion/extension. No Tenderness over the pes bursa. No evidence of instability in the AP plane or varus or valgus stress. Negative Lachman. Negative pivot shift. Negative anterior drawer test. positive posterior drawer test. positive Sunni. positive Apley grind. no medial or lateral joint line tenderness. No tenderness over the medial and lateral facet of the patella. No patellofemoral crepitations. No lateral tilting patella. No patella apprehension. No crepitation in the medial and lateral femoral condyle. No proximal or distal swelling, edema or tenderness. No gross motor or sensory deficits. No intra-articular swelling. Extra-articular swelling over the proximal medial aspect of the tibia. 2+ DP and PT pulses. No varus or valgus malalignment. Skin is intact. No rashes, scars or lesions.  Tenderness fibular head and femoral condyle.\par  \par Left Knee: Range of Motion in Degrees	\par 	  Claimant:	Normal:	\par Flexion Active	 135 	 135-degrees	\par Flexion Passive	 135	 135-degrees	\par Extension Active	 0-5	 0-5-degrees	\par Extension Passive	 0-5	 0-5-degrees	\par \par TTP over quadriceps origin at hip. No weakness to flexion/extension. No evidence of instability in the AP plane or varus or valgus stress. Negative Lachman. Negative pivot shift. Negative anterior drawer test. Negative posterior drawer test. Negative Sunni. Negative Apley grind. No medial or lateral joint line tenderness. No tenderness over the medial and lateral facet of the patella. No patellofemoral crepitations. No lateral tilting patella. No patellar apprehension. No crepitation in the medial and lateral femoral condyle. No proximal or distal swelling, edema or tenderness. No gross motor or sensory deficits. No intra-articular swelling. 2+ DP and PT pulses. No varus or valgus malalignment. Skin is intact. No rashes, scars or lesions. \par  [de-identified] : Procedure: MRI of Right hip \par Dated: 5/6/2022\par \par Impression:\par \par Trace right greater trochanteric bursitis. Unremarkable iliotibial band at the\par level of the pelvis and hip. The right hip joint is normal.\par \par Procedure: MRI of Right knee \par Dated: 5/6/22\par \par Impression:\par \par Findings suggestive of IT band friction syndrome. Correlate clinically.\par Small right knee joint effusion.\par Faint bone marrow edema in the lateral femoral condyle marginating the physeal scar compatible with stress reaction.\par Intact menisci, cruciate and collateral ligaments.\par  \par

## 2022-11-21 NOTE — HISTORY OF PRESENT ILLNESS
[Bending] : worsened by bending [Running] : worsened by running [Knee Flexion] : worsened with knee flexion [NSAIDs] : relieved by nonsteroidal anti-inflammatory drugs [Physical Therapy] : relieved by physical therapy [Rest] : relieved by rest [de-identified] : SAMUEL BERMAN is a 27 year female being seen for f/u visit R knee pain. She has been doing physical therapy and acupuncture. She is seeing some progress, but it is slow. She states that she has tried running yesterday and was able to run 1 mile. She notes she is still unable to run 1.5 miles straight through. Patient denies any recent fevers, chills or night sweats. Patient denies any radiating pain, numbness, tingling sensations, burning sensations, urinary or bowel incontinence.  [FreeTextEntry3] : Running, climbing stairs, pivoting, squatting, jumping.  [FreeTextEntry4] : Cortisone inj (01/13/22) into IT band, mobic, physical therapy, cortisone inj (10/26/21) into IT band. Both cortisone injection provided her about a month of relief, although second one was less effective. With PT, she did have some relief with graston technique; however, relief was temporary. Mobic along with OTC acetaminophen provide short term symptomatic and pain relief, respectively.   [FreeTextEntry5] : N/A

## 2022-11-21 NOTE — PHYSICAL EXAM
[de-identified] : Physical Exam:\par General: Well appearing, no acute distress, A&O\par Neurologic: A&Ox3, No focal deficits\par Head: NCAT without abrasions, lacerations, or ecchymosis to head, face, or scalp\par Eyes: No scleral icterus, no gross abnormalities\par Respiratory: Equal chest wall expansion bilaterally, no accessory muscle use\par Lymphatic: No lymphadenopathy palpated\par Skin: Warm and dry\par Psychiatric: Normal mood and affect\par \par Right Hip Exam\par \par Skin: Clean/dry and intact\par Inspection: No obvious deformity, no swelling.\par Pulses: 2+ DP/PT pulses\par ROM: 110 flexion without pain. Internal rotation to 15. External rotation to 35 with pain over greater troc.\par Painful ROM: None\par Tenderness: Positive tenderness over lateral femoral epicondyle. Otherwise no greater trochanter/glut medius insertion. No groin pain. No ttp over the ASIS/Illiac crest.\par Strength: 5/5 ADD/ABD/Q/H/TA/GS/EHL\par Neuro: Sensation in tact to light touch throughout, DTR normal\par Additional tests: Negative impingement test\par Pain with resisted hip abduction\par NAHED Test (Vinayak's Test): Negative\par FADIR Test (Labral Tear/HAYDE): Negative\par  \par Right Knee: Range of Motion in Degrees	\par 	  Claimant:	Normal:	\par Flexion Active	 135 	 135-degrees	\par Flexion Passive	 135	 135-degrees	\par Extension Active	 0-5	 0-5-degrees	\par Extension Passive	 0-5	 0-5-degrees	\par \par No effusion. Moderate to mild Patellar crepitation noted on flexion. IT band TTP but significantly softer than previous exam. No Lateral JL TTP.  No tenderness over the tendon at this time. No weakness to flexion/extension. No Tenderness over the pes bursa. No evidence of instability in the AP plane or varus or valgus stress. Negative Lachman. Negative pivot shift. Negative anterior drawer test. positive posterior drawer test. positive Sunni. positive Apley grind. no medial or lateral joint line tenderness. No tenderness over the medial and lateral facet of the patella. No patellofemoral crepitations. No lateral tilting patella. No patella apprehension. No crepitation in the medial and lateral femoral condyle. No proximal or distal swelling, edema or tenderness. No gross motor or sensory deficits. No intra-articular swelling. Extra-articular swelling over the proximal medial aspect of the tibia. 2+ DP and PT pulses. No varus or valgus malalignment. Skin is intact. No rashes, scars or lesions.  Tenderness fibular head and femoral condyle.\par  \par Left Knee: Range of Motion in Degrees	\par 	  Claimant:	Normal:	\par Flexion Active	 135 	 135-degrees	\par Flexion Passive	 135	 135-degrees	\par Extension Active	 0-5	 0-5-degrees	\par Extension Passive	 0-5	 0-5-degrees	\par \par TTP over quadriceps origin at hip. No weakness to flexion/extension. No evidence of instability in the AP plane or varus or valgus stress. Negative Lachman. Negative pivot shift. Negative anterior drawer test. Negative posterior drawer test. Negative Sunni. Negative Apley grind. No medial or lateral joint line tenderness. No tenderness over the medial and lateral facet of the patella. No patellofemoral crepitations. No lateral tilting patella. No patellar apprehension. No crepitation in the medial and lateral femoral condyle. No proximal or distal swelling, edema or tenderness. No gross motor or sensory deficits. No intra-articular swelling. 2+ DP and PT pulses. No varus or valgus malalignment. Skin is intact. No rashes, scars or lesions. \par  [de-identified] : Procedure: MRI of Right hip \par Dated: 5/6/2022\par \par Impression:\par \par Trace right greater trochanteric bursitis. Unremarkable iliotibial band at the\par level of the pelvis and hip. The right hip joint is normal.\par \par Procedure: MRI of Right knee \par Dated: 5/6/22\par \par Impression:\par \par Findings suggestive of IT band friction syndrome. Correlate clinically.\par Small right knee joint effusion.\par Faint bone marrow edema in the lateral femoral condyle marginating the physeal scar compatible with stress reaction.\par Intact menisci, cruciate and collateral ligaments.\par  \par

## 2022-11-21 NOTE — DISCUSSION/SUMMARY
[de-identified] : SAMUEL BERMAN is a 27 year female being seen for f/u visit R knee pain. She has been doing physical therapy and acupuncture. She is seeing some progress, but it is slow. She states that she has tried running yesterday and was able to run 1 mile. She notes she is still unable to run 1.5 miles straight through. Patient denies any recent fevers, chills or night sweats. Patient denies any radiating pain, numbness, tingling sensations, burning sensations, urinary or bowel incontinence. \par \par We had a thorough discussion regarding the nature of her pain, the pathophysiology, as well as all treatment options. I recommended her to continue with physical therapy and acupuncture. Patient was given prescription of formal physical therapy that she will perform 2x/wk for 6-8 wks as well as a prescription for acupuncture.  Patient will follow up in 6-8 wks for repeat clinical assessment. All questions were answered and the patient verbalized understanding. The patient is in agreement with this treatment plan.

## 2022-11-29 ENCOUNTER — FORM ENCOUNTER (OUTPATIENT)
Age: 27
End: 2022-11-29

## 2022-12-02 ENCOUNTER — NON-APPOINTMENT (OUTPATIENT)
Age: 27
End: 2022-12-02

## 2022-12-04 ENCOUNTER — FORM ENCOUNTER (OUTPATIENT)
Age: 27
End: 2022-12-04

## 2022-12-19 ENCOUNTER — APPOINTMENT (OUTPATIENT)
Dept: ORTHOPEDIC SURGERY | Facility: CLINIC | Age: 27
End: 2022-12-19

## 2022-12-19 PROCEDURE — 99072 ADDL SUPL MATRL&STAF TM PHE: CPT

## 2022-12-19 PROCEDURE — 99214 OFFICE O/P EST MOD 30 MIN: CPT

## 2022-12-19 NOTE — HISTORY OF PRESENT ILLNESS
[Bending] : worsened by bending [Running] : worsened by running [Knee Flexion] : worsened with knee flexion [NSAIDs] : relieved by nonsteroidal anti-inflammatory drugs [Physical Therapy] : relieved by physical therapy [Rest] : relieved by rest [de-identified] : SAMUEL BERMAN is a 27 year female being seen for f/u visit R knee pain. Patient has been doing physical therapy exercises and running 1.5 miles straight. Patient denies any recent fevers, chills or night sweats. Patient denies any radiating pain, numbness, tingling sensations, burning sensations, urinary or bowel incontinence.  [FreeTextEntry3] : Running, climbing stairs, pivoting, squatting, jumping.  [FreeTextEntry4] : Cortisone inj (01/13/22) into IT band, mobic, physical therapy, cortisone inj (10/26/21) into IT band. Both cortisone injection provided her about a month of relief, although second one was less effective. With PT, she did have some relief with graston technique; however, relief was temporary. Mobic along with OTC acetaminophen provide short term symptomatic and pain relief, respectively.   [FreeTextEntry5] : N/A

## 2022-12-19 NOTE — PHYSICAL EXAM
[de-identified] : Physical Exam:\par General: Well appearing, no acute distress, A&O\par Neurologic: A&Ox3, No focal deficits\par Head: NCAT without abrasions, lacerations, or ecchymosis to head, face, or scalp\par Eyes: No scleral icterus, no gross abnormalities\par Respiratory: Equal chest wall expansion bilaterally, no accessory muscle use\par Lymphatic: No lymphadenopathy palpated\par Skin: Warm and dry\par Psychiatric: Normal mood and affect\par \par Right Hip Exam\par \par Skin: Clean/dry and intact\par Inspection: No obvious deformity, no swelling.\par Pulses: 2+ DP/PT pulses\par ROM: 110 flexion without pain. Internal rotation to 15. External rotation to 35 with pain over greater troc.\par Painful ROM: None\par Tenderness: Positive tenderness over lateral femoral epicondyle. Otherwise no greater trochanter/glut medius insertion. No groin pain. No ttp over the ASIS/Illiac crest.\par Strength: 5/5 ADD/ABD/Q/H/TA/GS/EHL\par Neuro: Sensation in tact to light touch throughout, DTR normal\par Additional tests: Negative impingement test\par Pain with resisted hip abduction\par NAHED Test (Vinayak's Test): Negative\par FADIR Test (Labral Tear/HAYDE): Negative\par  \par Right Knee: Range of Motion in Degrees	\par 	  Claimant:	Normal:	\par Flexion Active	 135 	 135-degrees	\par Flexion Passive	 135	 135-degrees	\par Extension Active	 0-5	 0-5-degrees	\par Extension Passive	 0-5	 0-5-degrees	\par \par No effusion. Moderate to mild Patellar crepitation noted on flexion. IT band TTP but significantly softer than previous exam. No Lateral JL TTP.  No tenderness over the tendon at this time. No weakness to flexion/extension. No Tenderness over the pes bursa. No evidence of instability in the AP plane or varus or valgus stress. Negative Lachman. Negative pivot shift. Negative anterior drawer test. positive posterior drawer test. positive Sunni. positive Apley grind. no medial or lateral joint line tenderness. No tenderness over the medial and lateral facet of the patella. No patellofemoral crepitations. No lateral tilting patella. No patella apprehension. No crepitation in the medial and lateral femoral condyle. No proximal or distal swelling, edema or tenderness. No gross motor or sensory deficits. No intra-articular swelling. Extra-articular swelling over the proximal medial aspect of the tibia. 2+ DP and PT pulses. No varus or valgus malalignment. Skin is intact. No rashes, scars or lesions.  Tenderness fibular head and femoral condyle.\par  \par Left Knee: Range of Motion in Degrees	\par 	  Claimant:	Normal:	\par Flexion Active	 135 	 135-degrees	\par Flexion Passive	 135	 135-degrees	\par Extension Active	 0-5	 0-5-degrees	\par Extension Passive	 0-5	 0-5-degrees	\par \par TTP over quadriceps origin at hip. No weakness to flexion/extension. No evidence of instability in the AP plane or varus or valgus stress. Negative Lachman. Negative pivot shift. Negative anterior drawer test. Negative posterior drawer test. Negative Sunni. Negative Apley grind. No medial or lateral joint line tenderness. No tenderness over the medial and lateral facet of the patella. No patellofemoral crepitations. No lateral tilting patella. No patellar apprehension. No crepitation in the medial and lateral femoral condyle. No proximal or distal swelling, edema or tenderness. No gross motor or sensory deficits. No intra-articular swelling. 2+ DP and PT pulses. No varus or valgus malalignment. Skin is intact. No rashes, scars or lesions. \par

## 2022-12-19 NOTE — DISCUSSION/SUMMARY
[de-identified] : We had a thorough discussion regarding the nature of her pain, the pathophysiology, as well as all treatment options. She should continue with strengthening exercises. Patient will follow up in 3-4 wks for repeat clinical assessment. All questions were answered and the patient verbalized understanding. The patient is in agreement with this treatment plan.

## 2022-12-19 NOTE — ADDENDUM
[FreeTextEntry1] : Documented by Lesli Messina acting as a scribe for Dr. Gandara and Steve Bae PA-C on 12/19/2022.   All medical record entries made by the Scribe were at my, Dr. Gandara, and Steve Bae's, direction and personally dictated by me on 12/19/2022. I have reviewed the chart and agree that the record accurately reflects my personal performance of the history, physical exam, procedure and imaging.

## 2022-12-22 ENCOUNTER — NON-APPOINTMENT (OUTPATIENT)
Age: 27
End: 2022-12-22

## 2022-12-31 ENCOUNTER — NON-APPOINTMENT (OUTPATIENT)
Age: 27
End: 2022-12-31

## 2023-01-04 ENCOUNTER — FORM ENCOUNTER (OUTPATIENT)
Age: 28
End: 2023-01-04

## 2023-01-09 ENCOUNTER — APPOINTMENT (OUTPATIENT)
Dept: ORTHOPEDIC SURGERY | Facility: CLINIC | Age: 28
End: 2023-01-09
Payer: OTHER MISCELLANEOUS

## 2023-01-09 DIAGNOSIS — M76.31 ILIOTIBIAL BAND SYNDROME, RIGHT LEG: ICD-10-CM

## 2023-01-09 PROCEDURE — 99072 ADDL SUPL MATRL&STAF TM PHE: CPT

## 2023-01-09 PROCEDURE — 99214 OFFICE O/P EST MOD 30 MIN: CPT

## 2023-01-09 NOTE — PHYSICAL EXAM
[de-identified] : Physical Exam:\par General: Well appearing, no acute distress, A&O\par Neurologic: A&Ox3, No focal deficits\par Head: NCAT without abrasions, lacerations, or ecchymosis to head, face, or scalp\par Eyes: No scleral icterus, no gross abnormalities\par Respiratory: Equal chest wall expansion bilaterally, no accessory muscle use\par Lymphatic: No lymphadenopathy palpated\par Skin: Warm and dry\par Psychiatric: Normal mood and affect\par \par Right Hip Exam\par \par Skin: Clean/dry and intact\par Inspection: No obvious deformity, no swelling.\par Pulses: 2+ DP/PT pulses\par ROM: 110 flexion without pain. Internal rotation to 15. External rotation to 35 with pain over greater troc.\par Painful ROM: None\par Tenderness: Positive tenderness over lateral femoral epicondyle. Otherwise no greater trochanter/glut medius insertion. No groin pain. No ttp over the ASIS/Illiac crest.\par Strength: 5/5 ADD/ABD/Q/H/TA/GS/EHL\par Neuro: Sensation in tact to light touch throughout, DTR normal\par Additional tests: Negative impingement test\par Pain with resisted hip abduction\par NAHED Test (Vinayak's Test): Negative\par FADIR Test (Labral Tear/HAYDE): Negative\par  \par Right Knee: Range of Motion in Degrees	\par 	  Claimant:	Normal:	\par Flexion Active	 135 	 135-degrees	\par Flexion Passive	 135	 135-degrees	\par Extension Active	 0-5	 0-5-degrees	\par Extension Passive	 0-5	 0-5-degrees	\par \par No effusion. Moderate to mild Patellar crepitation noted on flexion. IT band TTP but significantly softer than previous exam. No Lateral JL TTP.  No tenderness over the tendon at this time. No weakness to flexion/extension. No Tenderness over the pes bursa. No evidence of instability in the AP plane or varus or valgus stress. Negative Lachman. Negative pivot shift. Negative anterior drawer test. positive posterior drawer test. positive Sunni. positive Apley grind. no medial or lateral joint line tenderness. No tenderness over the medial and lateral facet of the patella. No patellofemoral crepitations. No lateral tilting patella. No patella apprehension. No crepitation in the medial and lateral femoral condyle. No proximal or distal swelling, edema or tenderness. No gross motor or sensory deficits. No intra-articular swelling. Extra-articular swelling over the proximal medial aspect of the tibia. 2+ DP and PT pulses. No varus or valgus malalignment. Skin is intact. No rashes, scars or lesions.  Tenderness fibular head and femoral condyle.\par  \par Left Knee: Range of Motion in Degrees	\par 	  Claimant:	Normal:	\par Flexion Active	 135 	 135-degrees	\par Flexion Passive	 135	 135-degrees	\par Extension Active	 0-5	 0-5-degrees	\par Extension Passive	 0-5	 0-5-degrees	\par \par TTP over quadriceps origin at hip. No weakness to flexion/extension. No evidence of instability in the AP plane or varus or valgus stress. Negative Lachman. Negative pivot shift. Negative anterior drawer test. Negative posterior drawer test. Negative Sunni. Negative Apley grind. No medial or lateral joint line tenderness. No tenderness over the medial and lateral facet of the patella. No patellofemoral crepitations. No lateral tilting patella. No patellar apprehension. No crepitation in the medial and lateral femoral condyle. No proximal or distal swelling, edema or tenderness. No gross motor or sensory deficits. No intra-articular swelling. 2+ DP and PT pulses. No varus or valgus malalignment. Skin is intact. No rashes, scars or lesions. \par

## 2023-01-09 NOTE — DISCUSSION/SUMMARY
[de-identified] : We had a thorough discussion regarding the nature of her pain, the pathophysiology, as well as all treatment options. Patient is doing well at this time. Patient was given a note to return to work. If her symptoms persist or worsen she should follow up. Patient should continue with physical therapy and acupuncture. All questions were answered and the patient verbalized understanding. The patient is in agreement with this treatment plan.

## 2023-01-09 NOTE — HISTORY OF PRESENT ILLNESS
[Bending] : worsened by bending [Running] : worsened by running [Knee Flexion] : worsened with knee flexion [NSAIDs] : relieved by nonsteroidal anti-inflammatory drugs [Physical Therapy] : relieved by physical therapy [Rest] : relieved by rest [de-identified] : SAMUEL BERMAN is a 27 year female being seen for f/u visit R knee pain. She notes she has been running. She recently had covid and has been feeling more weak since. She notes PT and acupuncture has been helping with her pain and strengthening. She is still unable to do sharp turns. She would like to return to work. [FreeTextEntry3] : Running, climbing stairs, pivoting, squatting, jumping.  [FreeTextEntry4] : Cortisone inj (01/13/22) into IT band, mobic, physical therapy, cortisone inj (10/26/21) into IT band. Both cortisone injection provided her about a month of relief, although second one was less effective. With PT, she did have some relief with graston technique; however, relief was temporary. Mobic along with OTC acetaminophen provide short term symptomatic and pain relief, respectively.   [FreeTextEntry5] : N/A

## 2023-01-18 ENCOUNTER — FORM ENCOUNTER (OUTPATIENT)
Age: 28
End: 2023-01-18

## 2023-01-21 ENCOUNTER — OFFICE (OUTPATIENT)
Dept: URBAN - METROPOLITAN AREA CLINIC 12 | Facility: CLINIC | Age: 28
Setting detail: OPHTHALMOLOGY
End: 2023-01-21
Payer: COMMERCIAL

## 2023-01-21 DIAGNOSIS — S05.02XA: ICD-10-CM

## 2023-01-21 PROBLEM — H00.1 CHALAZION; RIGHT UPPER LID: Status: ACTIVE | Noted: 2023-01-21

## 2023-01-21 PROCEDURE — 99213 OFFICE O/P EST LOW 20 MIN: CPT | Performed by: OPTOMETRIST

## 2023-01-21 ASSESSMENT — LID EXAM ASSESSMENTS
OS_BLEPHARITIS: 2+
OD_BLEPHARITIS: 2+

## 2023-01-21 ASSESSMENT — KERATOMETRY
OS_AXISANGLE_DEGREES: 105
OD_K1POWER_DIOPTERS: 42.75
OD_AXISANGLE_DEGREES: 078
OS_K2POWER_DIOPTERS: 43.50
OS_K1POWER_DIOPTERS: 43.25
OD_K2POWER_DIOPTERS: 43.50

## 2023-01-21 ASSESSMENT — VISUAL ACUITY
OD_BCVA: 20/40
OS_BCVA: 20/40-2

## 2023-01-21 ASSESSMENT — CORNEAL TRAUMA - ABRASION: OD_ABRASION: PRESENT

## 2023-01-21 ASSESSMENT — REFRACTION_AUTOREFRACTION
OS_SPHERE: -1.50
OS_AXIS: 047
OD_SPHERE: -1.25
OS_CYLINDER: -0.25
OD_AXIS: 078
OD_CYLINDER: -0.25

## 2023-01-21 ASSESSMENT — SPHEQUIV_DERIVED
OD_SPHEQUIV: -1.375
OS_SPHEQUIV: -1.625

## 2023-01-21 ASSESSMENT — AXIALLENGTH_DERIVED
OD_AL: 24.2851
OS_AL: 24.291

## 2023-01-24 ENCOUNTER — FORM ENCOUNTER (OUTPATIENT)
Age: 28
End: 2023-01-24

## 2023-02-21 ENCOUNTER — OFFICE (OUTPATIENT)
Dept: URBAN - METROPOLITAN AREA CLINIC 116 | Facility: CLINIC | Age: 28
Setting detail: OPHTHALMOLOGY
End: 2023-02-21
Payer: COMMERCIAL

## 2023-02-21 DIAGNOSIS — H01.004: ICD-10-CM

## 2023-02-21 DIAGNOSIS — H52.13: ICD-10-CM

## 2023-02-21 DIAGNOSIS — H01.001: ICD-10-CM

## 2023-02-21 PROCEDURE — 92015 DETERMINE REFRACTIVE STATE: CPT | Performed by: OPTOMETRIST

## 2023-02-21 PROCEDURE — 92014 COMPRE OPH EXAM EST PT 1/>: CPT | Performed by: OPTOMETRIST

## 2023-02-21 ASSESSMENT — AXIALLENGTH_DERIVED
OS_AL: 24.3398
OD_AL: 24.2851

## 2023-02-21 ASSESSMENT — REFRACTION_AUTOREFRACTION
OD_AXIS: 035
OD_CYLINDER: -0.25
OS_CYLINDER: -0.25
OS_SPHERE: -1.50
OD_SPHERE: -1.25
OS_AXIS: 025

## 2023-02-21 ASSESSMENT — KERATOMETRY
OD_AXISANGLE_DEGREES: 085
OS_AXISANGLE_DEGREES: 100
OD_K2POWER_DIOPTERS: 43.50
OS_K1POWER_DIOPTERS: 43.00
OS_K2POWER_DIOPTERS: 43.50
OD_K1POWER_DIOPTERS: 42.75

## 2023-02-21 ASSESSMENT — LID EXAM ASSESSMENTS
OS_BLEPHARITIS: 2+
OD_BLEPHARITIS: 2+

## 2023-02-21 ASSESSMENT — SPHEQUIV_DERIVED
OD_SPHEQUIV: -1.375
OS_SPHEQUIV: -1.625

## 2023-02-21 ASSESSMENT — VISUAL ACUITY
OS_BCVA: 20/40-2
OD_BCVA: 20/40

## 2023-02-21 ASSESSMENT — CONFRONTATIONAL VISUAL FIELD TEST (CVF)
OD_FINDINGS: FULL
OS_FINDINGS: FULL

## 2023-02-21 ASSESSMENT — REFRACTION_MANIFEST
OD_SPHERE: -1.00
OS_CYLINDER: SPH
OD_CYLINDER: SPH
OS_SPHERE: -1.00
OD_VA1: 20/20
OS_VA1: 20/20

## 2023-02-21 ASSESSMENT — CORNEAL TRAUMA - ABRASION: OD_ABRASION: PRESENT

## 2023-02-21 ASSESSMENT — TONOMETRY: OD_IOP_MMHG: 21

## 2023-04-02 ENCOUNTER — OFFICE (OUTPATIENT)
Dept: URBAN - METROPOLITAN AREA CLINIC 12 | Facility: CLINIC | Age: 28
Setting detail: OPHTHALMOLOGY
End: 2023-04-02
Payer: COMMERCIAL

## 2023-04-02 DIAGNOSIS — H00.024: ICD-10-CM

## 2023-04-02 DIAGNOSIS — L25.9: ICD-10-CM

## 2023-04-02 DIAGNOSIS — H01.004: ICD-10-CM

## 2023-04-02 DIAGNOSIS — H01.001: ICD-10-CM

## 2023-04-02 PROCEDURE — 92012 INTRM OPH EXAM EST PATIENT: CPT | Performed by: OPTOMETRIST

## 2023-04-02 ASSESSMENT — KERATOMETRY
METHOD_AUTO_MANUAL: AUTO
OD_AXISANGLE_DEGREES: 085
OD_K1POWER_DIOPTERS: 42.75
OS_K1POWER_DIOPTERS: 42.75
OS_K2POWER_DIOPTERS: 43.50
OS_AXISANGLE_DEGREES: 097
OD_K2POWER_DIOPTERS: 43.50

## 2023-04-02 ASSESSMENT — VISUAL ACUITY
OS_BCVA: 20/40-1
OD_BCVA: 20/40-2

## 2023-04-02 ASSESSMENT — AXIALLENGTH_DERIVED
OS_AL: 24.2851
OD_AL: 24.2336

## 2023-04-02 ASSESSMENT — LID EXAM ASSESSMENTS
OS_BLEPHARITIS: 2+
OD_BLEPHARITIS: 2+

## 2023-04-02 ASSESSMENT — REFRACTION_AUTOREFRACTION
OS_SPHERE: -1.25
OD_AXIS: 068
OS_CYLINDER: -0.25
OD_SPHERE: -1.00
OS_AXIS: 062
OD_CYLINDER: -0.50

## 2023-04-02 ASSESSMENT — SPHEQUIV_DERIVED
OS_SPHEQUIV: -1.375
OD_SPHEQUIV: -1.25

## 2023-04-02 ASSESSMENT — REFRACTION_MANIFEST
OD_SPHERE: -1.00
OS_CYLINDER: SPH
OS_VA1: 20/20
OD_VA1: 20/20
OD_CYLINDER: SPH
OS_SPHERE: -1.00

## 2023-04-02 ASSESSMENT — CORNEAL TRAUMA - ABRASION: OD_ABRASION: ABSENT

## 2023-04-02 ASSESSMENT — CONFRONTATIONAL VISUAL FIELD TEST (CVF)
OD_FINDINGS: FULL
OS_FINDINGS: FULL

## 2023-06-07 ENCOUNTER — OFFICE (OUTPATIENT)
Dept: URBAN - METROPOLITAN AREA CLINIC 115 | Facility: CLINIC | Age: 28
Setting detail: OPHTHALMOLOGY
End: 2023-06-07
Payer: COMMERCIAL

## 2023-06-07 DIAGNOSIS — H00.11: ICD-10-CM

## 2023-06-07 PROBLEM — H01.00 BLEPHARITIS; RIGHT UPPER LID, LEFT UPPER LID: Status: ACTIVE | Noted: 2023-06-07

## 2023-06-07 PROCEDURE — 92012 INTRM OPH EXAM EST PATIENT: CPT | Performed by: OPTOMETRIST

## 2023-06-07 ASSESSMENT — REFRACTION_AUTOREFRACTION
OD_SPHERE: -1.00
OD_CYLINDER: -0.50
OS_SPHERE: -1.25
OS_CYLINDER: -0.25
OS_AXIS: 062
OD_AXIS: 068

## 2023-06-07 ASSESSMENT — VISUAL ACUITY
OD_BCVA: 20/20
OS_BCVA: 20/20

## 2023-06-07 ASSESSMENT — SPHEQUIV_DERIVED
OD_SPHEQUIV: -1.25
OS_SPHEQUIV: -1.375

## 2023-06-07 ASSESSMENT — KERATOMETRY
OS_AXISANGLE_DEGREES: 097
METHOD_AUTO_MANUAL: AUTO
OS_K1POWER_DIOPTERS: 42.75
OD_AXISANGLE_DEGREES: 085
OS_K2POWER_DIOPTERS: 43.50
OD_K1POWER_DIOPTERS: 42.75
OD_K2POWER_DIOPTERS: 43.50

## 2023-06-07 ASSESSMENT — AXIALLENGTH_DERIVED
OS_AL: 24.2851
OD_AL: 24.2336

## 2023-06-07 ASSESSMENT — REFRACTION_MANIFEST
OD_SPHERE: -1.00
OS_SPHERE: -1.00
OS_CYLINDER: SPH
OD_VA1: 20/20
OD_CYLINDER: SPH
OS_VA1: 20/20

## 2023-06-07 ASSESSMENT — CONFRONTATIONAL VISUAL FIELD TEST (CVF)
OS_FINDINGS: FULL
OD_FINDINGS: FULL

## 2023-06-07 ASSESSMENT — CORNEAL TRAUMA - ABRASION: OD_ABRASION: ABSENT

## 2023-06-07 ASSESSMENT — LID EXAM ASSESSMENTS
OD_BLEPHARITIS: 2+
OS_BLEPHARITIS: 2+

## 2023-12-27 ENCOUNTER — OFFICE (OUTPATIENT)
Dept: URBAN - METROPOLITAN AREA CLINIC 6 | Facility: CLINIC | Age: 28
Setting detail: OPHTHALMOLOGY
End: 2023-12-27
Payer: COMMERCIAL

## 2023-12-27 DIAGNOSIS — H00.024: ICD-10-CM

## 2023-12-27 PROBLEM — H01.004 BLEPHARITIS; RIGHT UPPER LID, LEFT UPPER LID: Status: ACTIVE | Noted: 2023-12-27

## 2023-12-27 PROBLEM — H01.001 BLEPHARITIS; RIGHT UPPER LID, LEFT UPPER LID: Status: ACTIVE | Noted: 2023-12-27

## 2023-12-27 PROCEDURE — 99213 OFFICE O/P EST LOW 20 MIN: CPT

## 2023-12-27 ASSESSMENT — REFRACTION_AUTOREFRACTION
OD_SPHERE: -1.25
OD_AXIS: 036
OS_SPHERE: -1.75
OD_CYLINDER: -0.25
OS_AXIS: 072
OS_CYLINDER: -0.50

## 2023-12-27 ASSESSMENT — LID EXAM ASSESSMENTS
OS_BLEPHARITIS: 2+
OD_BLEPHARITIS: 2+

## 2023-12-27 ASSESSMENT — CONFRONTATIONAL VISUAL FIELD TEST (CVF)
OS_FINDINGS: FULL
OD_FINDINGS: FULL

## 2023-12-27 ASSESSMENT — REFRACTION_MANIFEST
OS_SPHERE: -1.00
OD_VA1: 20/20
OS_VA1: 20/20
OS_CYLINDER: SPH
OD_CYLINDER: SPH
OD_SPHERE: -1.00

## 2023-12-27 ASSESSMENT — SPHEQUIV_DERIVED
OD_SPHEQUIV: -1.375
OS_SPHEQUIV: -2

## 2024-01-10 ENCOUNTER — OFFICE (OUTPATIENT)
Dept: URBAN - METROPOLITAN AREA CLINIC 6 | Facility: CLINIC | Age: 29
Setting detail: OPHTHALMOLOGY
End: 2024-01-10
Payer: COMMERCIAL

## 2024-01-10 ENCOUNTER — RX ONLY (RX ONLY)
Age: 29
End: 2024-01-10

## 2024-01-10 VITALS — HEIGHT: 55 IN

## 2024-01-10 DIAGNOSIS — H00.021: ICD-10-CM

## 2024-01-10 DIAGNOSIS — H00.14: ICD-10-CM

## 2024-01-10 PROBLEM — H01.00 BLEPHARITIS; RIGHT UPPER LID, LEFT UPPER LID: Status: ACTIVE | Noted: 2024-01-10

## 2024-01-10 PROCEDURE — 99213 OFFICE O/P EST LOW 20 MIN: CPT

## 2024-01-10 ASSESSMENT — CONFRONTATIONAL VISUAL FIELD TEST (CVF)
OS_FINDINGS: FULL
OD_FINDINGS: FULL

## 2024-01-10 ASSESSMENT — LID EXAM ASSESSMENTS
OS_BLEPHARITIS: 2+
OD_BLEPHARITIS: 2+

## 2024-01-10 ASSESSMENT — REFRACTION_AUTOREFRACTION
OS_CYLINDER: -0.50
OS_AXIS: 072
OD_AXIS: 036
OD_CYLINDER: -0.25
OS_SPHERE: -1.75
OD_SPHERE: -1.25

## 2024-01-10 ASSESSMENT — REFRACTION_MANIFEST
OD_SPHERE: -1.00
OS_VA1: 20/20
OS_SPHERE: -1.00
OD_CYLINDER: SPH
OD_VA1: 20/20
OS_CYLINDER: SPH

## 2024-01-10 ASSESSMENT — SPHEQUIV_DERIVED
OS_SPHEQUIV: -2
OD_SPHEQUIV: -1.375

## 2024-02-27 ENCOUNTER — OFFICE (OUTPATIENT)
Dept: URBAN - METROPOLITAN AREA CLINIC 104 | Facility: CLINIC | Age: 29
Setting detail: OPHTHALMOLOGY
End: 2024-02-27
Payer: COMMERCIAL

## 2024-02-27 DIAGNOSIS — H00.15: ICD-10-CM

## 2024-02-27 PROCEDURE — 99213 OFFICE O/P EST LOW 20 MIN: CPT | Performed by: OPTOMETRIST

## 2024-02-27 ASSESSMENT — CONFRONTATIONAL VISUAL FIELD TEST (CVF)
OS_FINDINGS: FULL
OD_FINDINGS: FULL

## 2024-02-27 ASSESSMENT — LID EXAM ASSESSMENTS
OS_BLEPHARITIS: 2+
OD_BLEPHARITIS: 2+